# Patient Record
Sex: FEMALE | Race: BLACK OR AFRICAN AMERICAN | Employment: FULL TIME | URBAN - METROPOLITAN AREA
[De-identification: names, ages, dates, MRNs, and addresses within clinical notes are randomized per-mention and may not be internally consistent; named-entity substitution may affect disease eponyms.]

---

## 2018-06-07 ENCOUNTER — HOSPITAL ENCOUNTER (OUTPATIENT)
Dept: LAB | Age: 48
Discharge: HOME | End: 2018-06-07
Payer: COMMERCIAL

## 2018-06-07 DIAGNOSIS — I42.8 OTHER CARDIOMYOPATHY (HCC): ICD-10-CM

## 2018-06-07 DIAGNOSIS — R53.82 CHRONIC FATIGUE: ICD-10-CM

## 2018-06-07 LAB
ALBUMIN SERPL-MCNC: 4.1 G/DL (ref 3.5–5)
ALBUMIN/GLOB SERPL: 1.2 {RATIO}
ALP SERPL-CCNC: 80 U/L (ref 50–136)
ALT SERPL-CCNC: 27 U/L (ref 12–65)
ANION GAP SERPL CALC-SCNC: 7 MMOL/L
AST SERPL-CCNC: 18 U/L (ref 15–37)
BILIRUB SERPL-MCNC: 0.2 MG/DL (ref 0.2–1.1)
BUN SERPL-MCNC: 16 MG/DL (ref 6–23)
CALCIUM SERPL-MCNC: 9.2 MG/DL (ref 8.3–10.4)
CHLORIDE SERPL-SCNC: 103 MMOL/L (ref 98–107)
CO2 SERPL-SCNC: 29 MMOL/L (ref 21–32)
CREAT SERPL-MCNC: 1 MG/DL (ref 0.6–1)
GLOBULIN SER CALC-MCNC: 3.3 G/DL
GLUCOSE SERPL-MCNC: 81 MG/DL (ref 65–100)
POTASSIUM SERPL-SCNC: 3.3 MMOL/L (ref 3.5–5.1)
PROT SERPL-MCNC: 7.4 G/DL (ref 6.3–8.2)
SODIUM SERPL-SCNC: 139 MMOL/L (ref 136–145)
TSH SERPL DL<=0.005 MIU/L-ACNC: 1.81 UIU/ML (ref 0.36–3.74)

## 2018-06-07 PROCEDURE — 84443 ASSAY THYROID STIM HORMONE: CPT | Performed by: INTERNAL MEDICINE

## 2018-06-07 PROCEDURE — 36415 COLL VENOUS BLD VENIPUNCTURE: CPT | Performed by: INTERNAL MEDICINE

## 2018-06-07 PROCEDURE — 80053 COMPREHEN METABOLIC PANEL: CPT | Performed by: INTERNAL MEDICINE

## 2019-06-10 ENCOUNTER — APPOINTMENT (RX ONLY)
Dept: URBAN - METROPOLITAN AREA CLINIC 349 | Facility: CLINIC | Age: 49
Setting detail: DERMATOLOGY
End: 2019-06-10

## 2019-06-10 DIAGNOSIS — L21.8 OTHER SEBORRHEIC DERMATITIS: ICD-10-CM

## 2019-06-10 DIAGNOSIS — L65.9 NONSCARRING HAIR LOSS, UNSPECIFIED: ICD-10-CM

## 2019-06-10 PROCEDURE — 99202 OFFICE O/P NEW SF 15 MIN: CPT

## 2019-06-10 PROCEDURE — ? PRESCRIPTION

## 2019-06-10 PROCEDURE — ? COUNSELING

## 2019-06-10 PROCEDURE — ? RECOMMENDATIONS

## 2019-06-10 RX ORDER — CLOBETASOL PROPIONATE 0.46 MG/ML
SOLUTION TOPICAL
Qty: 1 | Refills: 1 | Status: ERX | COMMUNITY
Start: 2019-06-10

## 2019-06-10 RX ADMIN — CLOBETASOL PROPIONATE: 0.46 SOLUTION TOPICAL at 00:00

## 2019-06-10 ASSESSMENT — LOCATION DETAILED DESCRIPTION DERM
LOCATION DETAILED: LEFT LATERAL FOREHEAD
LOCATION DETAILED: RIGHT LATERAL FOREHEAD
LOCATION DETAILED: LEFT INFERIOR LATERAL FOREHEAD
LOCATION DETAILED: RIGHT INFERIOR LATERAL FOREHEAD
LOCATION DETAILED: LEFT MEDIAL FRONTAL SCALP

## 2019-06-10 ASSESSMENT — LOCATION SIMPLE DESCRIPTION DERM
LOCATION SIMPLE: LEFT FOREHEAD
LOCATION SIMPLE: LEFT SCALP
LOCATION SIMPLE: RIGHT FOREHEAD

## 2019-06-10 ASSESSMENT — LOCATION ZONE DERM
LOCATION ZONE: FACE
LOCATION ZONE: SCALP

## 2019-06-10 NOTE — HPI: HAIR LOSS
Previous Labs: No
How Did The Hair Loss Occur?: sudden in onset
How Severe Is Your Hair Loss?: moderate
Additional History: Pt is currently here for concerns of the scalp. Pt states that she experiences hair loss, burning and itching of the scalp.

## 2019-06-10 NOTE — PROCEDURE: RECOMMENDATIONS
Recommendations (Free Text): Clobetasol scalp solution nightly as needed \\nPt washes her hair every two weeks.
Recommendations (Free Text): Told pt can use her Clobetasol solution to help
Detail Level: Zone

## 2020-02-12 ENCOUNTER — HOSPITAL ENCOUNTER (OUTPATIENT)
Dept: LAB | Age: 50
Discharge: HOME | End: 2020-02-12
Payer: COMMERCIAL

## 2020-02-12 DIAGNOSIS — Z79.899 HIGH RISK MEDICATION USE: ICD-10-CM

## 2020-02-12 LAB
ANION GAP SERPL CALC-SCNC: 4 MMOL/L (ref 7–16)
BUN SERPL-MCNC: 19 MG/DL (ref 6–23)
CALCIUM SERPL-MCNC: 9.6 MG/DL (ref 8.3–10.4)
CHLORIDE SERPL-SCNC: 103 MMOL/L (ref 98–107)
CO2 SERPL-SCNC: 32 MMOL/L (ref 21–32)
CREAT SERPL-MCNC: 0.9 MG/DL (ref 0.6–1)
GLUCOSE SERPL-MCNC: 83 MG/DL (ref 65–100)
POTASSIUM SERPL-SCNC: 4.1 MMOL/L (ref 3.5–5.1)
SODIUM SERPL-SCNC: 139 MMOL/L (ref 136–145)

## 2020-02-12 PROCEDURE — 80048 BASIC METABOLIC PNL TOTAL CA: CPT

## 2020-02-12 PROCEDURE — 36415 COLL VENOUS BLD VENIPUNCTURE: CPT

## 2020-03-10 ENCOUNTER — HOSPITAL ENCOUNTER (OUTPATIENT)
Dept: LAB | Age: 50
Discharge: HOME | End: 2020-03-10
Payer: COMMERCIAL

## 2020-03-10 DIAGNOSIS — Z79.899 HIGH RISK MEDICATION USE: ICD-10-CM

## 2020-03-10 LAB
ANION GAP SERPL CALC-SCNC: 6 MMOL/L (ref 7–16)
BUN SERPL-MCNC: 15 MG/DL (ref 6–23)
CALCIUM SERPL-MCNC: 9.4 MG/DL (ref 8.3–10.4)
CHLORIDE SERPL-SCNC: 106 MMOL/L (ref 98–107)
CO2 SERPL-SCNC: 29 MMOL/L (ref 21–32)
CREAT SERPL-MCNC: 0.9 MG/DL (ref 0.6–1)
GLUCOSE SERPL-MCNC: 87 MG/DL (ref 65–100)
POTASSIUM SERPL-SCNC: 3.8 MMOL/L (ref 3.5–5.1)
SODIUM SERPL-SCNC: 141 MMOL/L (ref 136–145)

## 2020-03-10 PROCEDURE — 80048 BASIC METABOLIC PNL TOTAL CA: CPT

## 2020-03-10 PROCEDURE — 36415 COLL VENOUS BLD VENIPUNCTURE: CPT

## 2020-06-03 ENCOUNTER — HOSPITAL ENCOUNTER (OUTPATIENT)
Dept: LAB | Age: 50
Discharge: HOME | End: 2020-06-03
Payer: COMMERCIAL

## 2020-06-03 DIAGNOSIS — I42.0 DILATED CARDIOMYOPATHY (HCC): ICD-10-CM

## 2020-06-03 LAB
ANION GAP SERPL CALC-SCNC: 5 MMOL/L (ref 7–16)
BUN SERPL-MCNC: 14 MG/DL (ref 6–23)
CALCIUM SERPL-MCNC: 9.1 MG/DL (ref 8.3–10.4)
CHLORIDE SERPL-SCNC: 106 MMOL/L (ref 98–107)
CO2 SERPL-SCNC: 28 MMOL/L (ref 21–32)
CREAT SERPL-MCNC: 0.95 MG/DL (ref 0.6–1)
GLUCOSE SERPL-MCNC: 90 MG/DL (ref 65–100)
POTASSIUM SERPL-SCNC: 3.8 MMOL/L (ref 3.5–5.1)
SODIUM SERPL-SCNC: 139 MMOL/L (ref 136–145)

## 2020-06-03 PROCEDURE — 80048 BASIC METABOLIC PNL TOTAL CA: CPT

## 2020-06-03 PROCEDURE — 36415 COLL VENOUS BLD VENIPUNCTURE: CPT

## 2020-06-11 ENCOUNTER — HOSPITAL ENCOUNTER (OUTPATIENT)
Dept: LAB | Age: 50
Discharge: HOME | End: 2020-06-11
Attending: INTERNAL MEDICINE
Payer: COMMERCIAL

## 2020-06-11 LAB
ANION GAP SERPL CALC-SCNC: 3 MMOL/L (ref 7–16)
BUN SERPL-MCNC: 9 MG/DL (ref 6–23)
CALCIUM SERPL-MCNC: 8.6 MG/DL (ref 8.3–10.4)
CHLORIDE SERPL-SCNC: 111 MMOL/L (ref 98–107)
CO2 SERPL-SCNC: 27 MMOL/L (ref 21–32)
CREAT SERPL-MCNC: 0.93 MG/DL (ref 0.6–1)
GLUCOSE SERPL-MCNC: 84 MG/DL (ref 65–100)
POTASSIUM SERPL-SCNC: 4.1 MMOL/L (ref 3.5–5.1)
SODIUM SERPL-SCNC: 141 MMOL/L (ref 136–145)

## 2020-06-11 PROCEDURE — 80048 BASIC METABOLIC PNL TOTAL CA: CPT

## 2020-06-11 PROCEDURE — 36415 COLL VENOUS BLD VENIPUNCTURE: CPT

## 2020-07-24 ENCOUNTER — APPOINTMENT (RX ONLY)
Dept: URBAN - METROPOLITAN AREA CLINIC 349 | Facility: CLINIC | Age: 50
Setting detail: DERMATOLOGY
End: 2020-07-24

## 2020-07-24 DIAGNOSIS — L65.9 NONSCARRING HAIR LOSS, UNSPECIFIED: ICD-10-CM

## 2020-07-24 DIAGNOSIS — L21.8 OTHER SEBORRHEIC DERMATITIS: ICD-10-CM | Status: WORSENING

## 2020-07-24 PROBLEM — K21.9 GASTRO-ESOPHAGEAL REFLUX DISEASE WITHOUT ESOPHAGITIS: Status: ACTIVE | Noted: 2020-07-24

## 2020-07-24 PROBLEM — I10 ESSENTIAL (PRIMARY) HYPERTENSION: Status: ACTIVE | Noted: 2020-07-24

## 2020-07-24 PROCEDURE — 99213 OFFICE O/P EST LOW 20 MIN: CPT

## 2020-07-24 PROCEDURE — ? PRESCRIPTION

## 2020-07-24 PROCEDURE — ? COUNSELING

## 2020-07-24 PROCEDURE — ? RECOMMENDATIONS

## 2020-07-24 RX ORDER — CLOBETASOL PROPIONATE 0.46 MG/ML
SOLUTION TOPICAL
Qty: 1 | Refills: 1 | Status: ERX

## 2020-07-24 RX ORDER — KETOCONAZOLE 20 MG/ML
SHAMPOO, SUSPENSION TOPICAL
Qty: 1 | Refills: 3 | Status: ERX | COMMUNITY
Start: 2020-07-24

## 2020-07-24 RX ADMIN — KETOCONAZOLE: 20 SHAMPOO, SUSPENSION TOPICAL at 00:00

## 2020-07-24 ASSESSMENT — LOCATION ZONE DERM
LOCATION ZONE: SCALP
LOCATION ZONE: FACE

## 2020-07-24 ASSESSMENT — LOCATION DETAILED DESCRIPTION DERM
LOCATION DETAILED: LEFT MEDIAL FRONTAL SCALP
LOCATION DETAILED: RIGHT LATERAL FOREHEAD
LOCATION DETAILED: LEFT INFERIOR LATERAL FOREHEAD
LOCATION DETAILED: LEFT LATERAL FOREHEAD

## 2020-07-24 ASSESSMENT — LOCATION SIMPLE DESCRIPTION DERM
LOCATION SIMPLE: RIGHT FOREHEAD
LOCATION SIMPLE: LEFT FOREHEAD
LOCATION SIMPLE: LEFT SCALP

## 2020-07-24 NOTE — PROCEDURE: RECOMMENDATIONS
Recommendations (Free Text): Clobetasol scalp solution nightly as needed \\nPt washes her hair once a week.
Detail Level: Zone
Recommendations (Free Text): Told pt can use her Clobetasol solution to help

## 2021-02-01 ENCOUNTER — RX ONLY (OUTPATIENT)
Age: 51
Setting detail: RX ONLY
End: 2021-02-01

## 2021-02-01 RX ORDER — CLOBETASOL PROPIONATE 0.5 MG/ML
SOLUTION TOPICAL
Qty: 1 | Refills: 1 | Status: ERX

## 2021-03-12 ENCOUNTER — APPOINTMENT (RX ONLY)
Dept: URBAN - METROPOLITAN AREA CLINIC 349 | Facility: CLINIC | Age: 51
Setting detail: DERMATOLOGY
End: 2021-03-12

## 2021-03-12 DIAGNOSIS — L72.8 OTHER FOLLICULAR CYSTS OF THE SKIN AND SUBCUTANEOUS TISSUE: ICD-10-CM

## 2021-03-12 PROCEDURE — ? OTHER

## 2021-03-12 PROCEDURE — ? RECOMMENDATIONS

## 2021-03-12 PROCEDURE — ? COUNSELING

## 2021-03-12 ASSESSMENT — LOCATION ZONE DERM: LOCATION ZONE: TRUNK

## 2021-03-12 ASSESSMENT — LOCATION SIMPLE DESCRIPTION DERM: LOCATION SIMPLE: CHEST

## 2021-03-12 ASSESSMENT — LOCATION DETAILED DESCRIPTION DERM: LOCATION DETAILED: LEFT MEDIAL SUPERIOR CHEST

## 2021-03-12 NOTE — PROCEDURE: RECOMMENDATIONS
Render Risk Assessment In Note?: no
Recommendations (Free Text): Discussed seeing plastic surgeon to remove since on chest
Detail Level: Simple

## 2021-03-12 NOTE — PROCEDURE: OTHER
Note Text (......Xxx Chief Complaint.): This diagnosis correlates with the
Render Risk Assessment In Note?: yes
Other (Free Text): No charge for visit today
Detail Level: Zone

## 2021-10-25 ENCOUNTER — HOSPITAL ENCOUNTER (OUTPATIENT)
Dept: LAB | Age: 51
Discharge: HOME | End: 2021-10-25
Payer: COMMERCIAL

## 2021-10-25 DIAGNOSIS — I42.0 DILATED CARDIOMYOPATHY (HCC): ICD-10-CM

## 2021-10-25 DIAGNOSIS — R00.2 PALPITATIONS: ICD-10-CM

## 2021-10-25 LAB
ANION GAP SERPL CALC-SCNC: 5 MMOL/L (ref 7–16)
BASOPHILS # BLD: 0 K/UL (ref 0–0.2)
BASOPHILS NFR BLD: 0 % (ref 0–2)
BUN SERPL-MCNC: 12 MG/DL (ref 6–23)
CALCIUM SERPL-MCNC: 9.4 MG/DL (ref 8.3–10.4)
CHLORIDE SERPL-SCNC: 108 MMOL/L (ref 98–107)
CO2 SERPL-SCNC: 27 MMOL/L (ref 21–32)
CREAT SERPL-MCNC: 0.91 MG/DL (ref 0.6–1)
DIFFERENTIAL METHOD BLD: ABNORMAL
EOSINOPHIL # BLD: 0.1 K/UL (ref 0–0.8)
EOSINOPHIL NFR BLD: 2 % (ref 0.5–7.8)
ERYTHROCYTE [DISTWIDTH] IN BLOOD BY AUTOMATED COUNT: 12.8 % (ref 11.9–14.6)
GLUCOSE SERPL-MCNC: 82 MG/DL (ref 65–100)
HCT VFR BLD AUTO: 37 % (ref 35.8–46.3)
HGB BLD-MCNC: 11.3 G/DL (ref 11.7–15.4)
IMM GRANULOCYTES # BLD AUTO: 0 K/UL (ref 0–0.5)
IMM GRANULOCYTES NFR BLD AUTO: 0 % (ref 0–5)
INR PPP: 0.9
LYMPHOCYTES # BLD: 2 K/UL (ref 0.5–4.6)
LYMPHOCYTES NFR BLD: 45 % (ref 13–44)
MCH RBC QN AUTO: 28.4 PG (ref 26.1–32.9)
MCHC RBC AUTO-ENTMCNC: 30.5 G/DL (ref 31.4–35)
MCV RBC AUTO: 93 FL (ref 79.6–97.8)
MONOCYTES # BLD: 0.4 K/UL (ref 0.1–1.3)
MONOCYTES NFR BLD: 9 % (ref 4–12)
NEUTS SEG # BLD: 1.9 K/UL (ref 1.7–8.2)
NEUTS SEG NFR BLD: 43 % (ref 43–78)
NRBC # BLD: 0 K/UL (ref 0–0.2)
PLATELET # BLD AUTO: 277 K/UL (ref 150–450)
PMV BLD AUTO: 12.7 FL (ref 9.4–12.3)
POTASSIUM SERPL-SCNC: 3.6 MMOL/L (ref 3.5–5.1)
PROTHROMBIN TIME: 12.6 SEC (ref 12.6–14.5)
RBC # BLD AUTO: 3.98 M/UL (ref 4.05–5.2)
SODIUM SERPL-SCNC: 140 MMOL/L (ref 136–145)
WBC # BLD AUTO: 4.5 K/UL (ref 4.3–11.1)

## 2021-10-25 PROCEDURE — 36415 COLL VENOUS BLD VENIPUNCTURE: CPT

## 2021-10-25 PROCEDURE — 85025 COMPLETE CBC W/AUTO DIFF WBC: CPT

## 2021-10-25 PROCEDURE — 85610 PROTHROMBIN TIME: CPT

## 2021-10-25 PROCEDURE — 80048 BASIC METABOLIC PNL TOTAL CA: CPT

## 2021-10-26 NOTE — PROGRESS NOTES
Patient pre-assessment complete for Mamie Lechuga scheduled for ICD, arrival time 0600. Patient verified using . Patient instructed to bring all home medications in labeled bottles on the day of procedure. NPO status reinforced. . Patient instructed to HOLD aldactone and over the counter medications. Instructed they can take all other medications excluding vitamins & supplements. Patient verbalizes understanding of all instructions & denies any questions at this time.

## 2021-10-27 ENCOUNTER — APPOINTMENT (OUTPATIENT)
Dept: GENERAL RADIOLOGY | Age: 51
End: 2021-10-27
Attending: INTERNAL MEDICINE
Payer: COMMERCIAL

## 2021-10-27 ENCOUNTER — HOSPITAL ENCOUNTER (OUTPATIENT)
Age: 51
Setting detail: OUTPATIENT SURGERY
Discharge: HOME | End: 2021-10-27
Attending: INTERNAL MEDICINE | Admitting: INTERNAL MEDICINE
Payer: COMMERCIAL

## 2021-10-27 ENCOUNTER — ANESTHESIA (OUTPATIENT)
Dept: CARDIAC CATH/INVASIVE PROCEDURES | Age: 51
End: 2021-10-27
Payer: COMMERCIAL

## 2021-10-27 ENCOUNTER — ANESTHESIA EVENT (OUTPATIENT)
Dept: CARDIAC CATH/INVASIVE PROCEDURES | Age: 51
End: 2021-10-27
Payer: COMMERCIAL

## 2021-10-27 VITALS
TEMPERATURE: 98.4 F | WEIGHT: 149 LBS | RESPIRATION RATE: 16 BRPM | OXYGEN SATURATION: 99 % | HEART RATE: 67 BPM | SYSTOLIC BLOOD PRESSURE: 105 MMHG | HEIGHT: 65 IN | DIASTOLIC BLOOD PRESSURE: 80 MMHG | BODY MASS INDEX: 24.83 KG/M2

## 2021-10-27 DIAGNOSIS — I42.0 DILATED CARDIOMYOPATHY (HCC): ICD-10-CM

## 2021-10-27 DIAGNOSIS — I42.8 NICM (NONISCHEMIC CARDIOMYOPATHY) (HCC): ICD-10-CM

## 2021-10-27 DIAGNOSIS — I42.9 CARDIOMYOPATHY (HCC): Primary | ICD-10-CM

## 2021-10-27 LAB
ANION GAP SERPL CALC-SCNC: 4 MMOL/L (ref 7–16)
ATRIAL RATE: 54 BPM
BUN SERPL-MCNC: 14 MG/DL (ref 6–23)
CALCIUM SERPL-MCNC: 9.6 MG/DL (ref 8.3–10.4)
CALCULATED P AXIS, ECG09: 23 DEGREES
CALCULATED R AXIS, ECG10: 0 DEGREES
CALCULATED T AXIS, ECG11: 30 DEGREES
CHLORIDE SERPL-SCNC: 104 MMOL/L (ref 98–107)
CO2 SERPL-SCNC: 31 MMOL/L (ref 21–32)
CREAT SERPL-MCNC: 0.96 MG/DL (ref 0.6–1)
DIAGNOSIS, 93000: NORMAL
ERYTHROCYTE [DISTWIDTH] IN BLOOD BY AUTOMATED COUNT: 12.6 % (ref 11.9–14.6)
GLUCOSE SERPL-MCNC: 85 MG/DL (ref 65–100)
HCT VFR BLD AUTO: 38.1 % (ref 35.8–46.3)
HGB BLD-MCNC: 11.8 G/DL (ref 11.7–15.4)
MAGNESIUM SERPL-MCNC: 1.9 MG/DL (ref 1.8–2.4)
MCH RBC QN AUTO: 28.5 PG (ref 26.1–32.9)
MCHC RBC AUTO-ENTMCNC: 31 G/DL (ref 31.4–35)
MCV RBC AUTO: 92 FL (ref 79.6–97.8)
NRBC # BLD: 0 K/UL (ref 0–0.2)
P-R INTERVAL, ECG05: 200 MS
PLATELET # BLD AUTO: 285 K/UL (ref 150–450)
PMV BLD AUTO: 10.1 FL (ref 9.4–12.3)
POTASSIUM SERPL-SCNC: 3.2 MMOL/L (ref 3.5–5.1)
Q-T INTERVAL, ECG07: 442 MS
QRS DURATION, ECG06: 84 MS
QTC CALCULATION (BEZET), ECG08: 419 MS
RBC # BLD AUTO: 4.14 M/UL (ref 4.05–5.2)
SODIUM SERPL-SCNC: 139 MMOL/L (ref 136–145)
VENTRICULAR RATE, ECG03: 54 BPM
WBC # BLD AUTO: 5.3 K/UL (ref 4.3–11.1)

## 2021-10-27 PROCEDURE — 74011000272 HC RX REV CODE- 272: Performed by: INTERNAL MEDICINE

## 2021-10-27 PROCEDURE — 76060000034 HC ANESTHESIA 1.5 TO 2 HR: Performed by: INTERNAL MEDICINE

## 2021-10-27 PROCEDURE — 93005 ELECTROCARDIOGRAM TRACING: CPT | Performed by: INTERNAL MEDICINE

## 2021-10-27 PROCEDURE — 74011000250 HC RX REV CODE- 250: Performed by: INTERNAL MEDICINE

## 2021-10-27 PROCEDURE — C1894 INTRO/SHEATH, NON-LASER: HCPCS | Performed by: INTERNAL MEDICINE

## 2021-10-27 PROCEDURE — 77030010507 HC ADH SKN DERMBND J&J -B: Performed by: INTERNAL MEDICINE

## 2021-10-27 PROCEDURE — 77030013687 HC GD NDL BARD -B: Performed by: INTERNAL MEDICINE

## 2021-10-27 PROCEDURE — 80048 BASIC METABOLIC PNL TOTAL CA: CPT

## 2021-10-27 PROCEDURE — 74011250636 HC RX REV CODE- 250/636: Performed by: INTERNAL MEDICINE

## 2021-10-27 PROCEDURE — 77030022704 HC SUT VLOC COVD -B: Performed by: INTERNAL MEDICINE

## 2021-10-27 PROCEDURE — C1777 LEAD, AICD, ENDO SINGLE COIL: HCPCS | Performed by: INTERNAL MEDICINE

## 2021-10-27 PROCEDURE — 85027 COMPLETE CBC AUTOMATED: CPT

## 2021-10-27 PROCEDURE — 77030013504 HC DEV ELECSURG MEDT -F: Performed by: INTERNAL MEDICINE

## 2021-10-27 PROCEDURE — 77030018547 HC SUT ETHBND1 J&J -B: Performed by: INTERNAL MEDICINE

## 2021-10-27 PROCEDURE — 74011250636 HC RX REV CODE- 250/636: Performed by: ANESTHESIOLOGY

## 2021-10-27 PROCEDURE — C1892 INTRO/SHEATH,FIXED,PEEL-AWAY: HCPCS | Performed by: INTERNAL MEDICINE

## 2021-10-27 PROCEDURE — 77030041279 HC DRSG PRMSL AG MDII -B: Performed by: INTERNAL MEDICINE

## 2021-10-27 PROCEDURE — 74011250636 HC RX REV CODE- 250/636: Performed by: NURSE ANESTHETIST, CERTIFIED REGISTERED

## 2021-10-27 PROCEDURE — 83735 ASSAY OF MAGNESIUM: CPT

## 2021-10-27 PROCEDURE — C1898 LEAD, PMKR, OTHER THAN TRANS: HCPCS | Performed by: INTERNAL MEDICINE

## 2021-10-27 PROCEDURE — 71045 X-RAY EXAM CHEST 1 VIEW: CPT

## 2021-10-27 PROCEDURE — 33249 INSJ/RPLCMT DEFIB W/LEAD(S): CPT | Performed by: INTERNAL MEDICINE

## 2021-10-27 PROCEDURE — C1721 AICD, DUAL CHAMBER: HCPCS | Performed by: INTERNAL MEDICINE

## 2021-10-27 PROCEDURE — 74011000636 HC RX REV CODE- 636: Performed by: INTERNAL MEDICINE

## 2021-10-27 PROCEDURE — 77030033067 HC SUT PDO STRATFX SPIR J&J -B: Performed by: INTERNAL MEDICINE

## 2021-10-27 PROCEDURE — 74011000250 HC RX REV CODE- 250: Performed by: NURSE ANESTHETIST, CERTIFIED REGISTERED

## 2021-10-27 DEVICE — INTEGRATED BIPOLAR PACE/SENSE AND DEFIBRILLATION LEAD
Type: IMPLANTABLE DEVICE | Site: CHEST | Status: FUNCTIONAL
Brand: RELIANCE 4-FRONT™

## 2021-10-27 DEVICE — PACE/SENSE LEAD
Type: IMPLANTABLE DEVICE | Site: CHEST | Status: FUNCTIONAL
Brand: INGEVITY™+

## 2021-10-27 DEVICE — IMPLANTABLE CARDIOVERTER DEFIBRILLATOR DR
Type: IMPLANTABLE DEVICE | Site: CHEST | Status: FUNCTIONAL
Brand: VIGILANT™ EL ICD DR

## 2021-10-27 RX ORDER — HYDROMORPHONE HYDROCHLORIDE 2 MG/ML
0.5 INJECTION, SOLUTION INTRAMUSCULAR; INTRAVENOUS; SUBCUTANEOUS
Status: DISCONTINUED | OUTPATIENT
Start: 2021-10-27 | End: 2021-10-27 | Stop reason: HOSPADM

## 2021-10-27 RX ORDER — PROPOFOL 10 MG/ML
INJECTION, EMULSION INTRAVENOUS AS NEEDED
Status: DISCONTINUED | OUTPATIENT
Start: 2021-10-27 | End: 2021-10-27 | Stop reason: HOSPADM

## 2021-10-27 RX ORDER — CLINDAMYCIN PHOSPHATE 900 MG/50ML
900 INJECTION INTRAVENOUS ONCE
Status: COMPLETED | OUTPATIENT
Start: 2021-10-27 | End: 2021-10-27

## 2021-10-27 RX ORDER — LIDOCAINE HYDROCHLORIDE 20 MG/ML
INJECTION, SOLUTION EPIDURAL; INFILTRATION; INTRACAUDAL; PERINEURAL AS NEEDED
Status: DISCONTINUED | OUTPATIENT
Start: 2021-10-27 | End: 2021-10-27 | Stop reason: HOSPADM

## 2021-10-27 RX ORDER — PROPOFOL 10 MG/ML
INJECTION, EMULSION INTRAVENOUS
Status: DISCONTINUED | OUTPATIENT
Start: 2021-10-27 | End: 2021-10-27 | Stop reason: HOSPADM

## 2021-10-27 RX ORDER — SODIUM CHLORIDE, SODIUM LACTATE, POTASSIUM CHLORIDE, CALCIUM CHLORIDE 600; 310; 30; 20 MG/100ML; MG/100ML; MG/100ML; MG/100ML
100 INJECTION, SOLUTION INTRAVENOUS CONTINUOUS
Status: DISCONTINUED | OUTPATIENT
Start: 2021-10-27 | End: 2021-10-27 | Stop reason: HOSPADM

## 2021-10-27 RX ORDER — SODIUM CHLORIDE, SODIUM LACTATE, POTASSIUM CHLORIDE, CALCIUM CHLORIDE 600; 310; 30; 20 MG/100ML; MG/100ML; MG/100ML; MG/100ML
75 INJECTION, SOLUTION INTRAVENOUS CONTINUOUS
Status: DISCONTINUED | OUTPATIENT
Start: 2021-10-27 | End: 2021-10-27 | Stop reason: HOSPADM

## 2021-10-27 RX ORDER — OXYCODONE HYDROCHLORIDE 5 MG/1
5 TABLET ORAL
Qty: 5 TABLET | Refills: 0 | Status: SHIPPED | OUTPATIENT
Start: 2021-10-27 | End: 2021-10-30

## 2021-10-27 RX ORDER — NALOXONE HYDROCHLORIDE 0.4 MG/ML
0.04 INJECTION, SOLUTION INTRAMUSCULAR; INTRAVENOUS; SUBCUTANEOUS
Status: DISCONTINUED | OUTPATIENT
Start: 2021-10-27 | End: 2021-10-27 | Stop reason: HOSPADM

## 2021-10-27 RX ORDER — POLYETHYLENE GLYCOL 400 AND PROPYLENE GLYCOL 4; 3 MG/ML; MG/ML
1 SOLUTION/ DROPS OPHTHALMIC AS NEEDED
COMMUNITY

## 2021-10-27 RX ADMIN — HYDROMORPHONE HYDROCHLORIDE 0.5 MG: 2 INJECTION, SOLUTION INTRAMUSCULAR; INTRAVENOUS; SUBCUTANEOUS at 09:58

## 2021-10-27 RX ADMIN — PROPOFOL 30 MG: 10 INJECTION, EMULSION INTRAVENOUS at 08:58

## 2021-10-27 RX ADMIN — PROPOFOL 50 MG: 10 INJECTION, EMULSION INTRAVENOUS at 08:01

## 2021-10-27 RX ADMIN — HYDROMORPHONE HYDROCHLORIDE 0.5 MG: 2 INJECTION, SOLUTION INTRAMUSCULAR; INTRAVENOUS; SUBCUTANEOUS at 09:42

## 2021-10-27 RX ADMIN — HYDROMORPHONE HYDROCHLORIDE 0.5 MG: 2 INJECTION, SOLUTION INTRAMUSCULAR; INTRAVENOUS; SUBCUTANEOUS at 09:51

## 2021-10-27 RX ADMIN — CLINDAMYCIN PHOSPHATE 900 MG: 900 INJECTION, SOLUTION INTRAVENOUS at 08:05

## 2021-10-27 RX ADMIN — SODIUM CHLORIDE, SODIUM LACTATE, POTASSIUM CHLORIDE, AND CALCIUM CHLORIDE: 600; 310; 30; 20 INJECTION, SOLUTION INTRAVENOUS at 07:52

## 2021-10-27 RX ADMIN — PROPOFOL 140 MCG/KG/MIN: 10 INJECTION, EMULSION INTRAVENOUS at 08:01

## 2021-10-27 RX ADMIN — LIDOCAINE HYDROCHLORIDE 40 MG: 20 INJECTION, SOLUTION EPIDURAL; INFILTRATION; INTRACAUDAL; PERINEURAL at 08:01

## 2021-10-27 NOTE — PROCEDURES
: Reno Orozco. Bharati Brown MD    REFERRING: Kasia Ratliff MD    Pre-Procedure Diagnosis  1. Chronic systolic heart failure, ejection fraction of 30-35%  2. Nonischemic dilated cardiomyopathy. 3. Class II heart failure symptoms. 4. Chronic systolic heart failure for a minimum of 3 months duration on optimal medical therapy. 5. Primary prevention indications for defibrillator  6. Life expectancy greater than 1 year. Procedure Performed  1. Insertion of an implantable cardioverter defibrillator. Complications: None    Contrast: 15 cc    Fluoroscopy Time: 6.5 minutes    Anesthesia: Con-Sed     Estimated Blood Loss: Less than 10 mL     Specimens: * No specimens in log *     Patient Information and Indications: The procedure, indications, risks, benefits, and alternatives were discussed with the patient and family members, who desired to proceed after questions were answered and informed consent was documented. Procedure: After informed consent was obtained, the patient was brought to the Electrophysiology Laboratory in a fasting state and was prepped and draped in sterile fashion. Prophylactic antibiotic was administered 10 minutes prior to skin incision: refer to anesthesia procedural documentation for full details. Conscious sedation was administered by anesthesia with continuous oxygen saturation measurement and blood pressure measurement. A left upper extremity venogram demonstrated a patient left axillary and subclavian vein without obvious obstruction. Local anesthetic (sensorcaine) was delivered to the left pectoral region. An incision was made parallel to the deltopectoral groove. A subcutaneous pocket was created using blunt dissection and electrocautery, and adequate hemostasis was established. Access x 2 was obtained under fluoroscopic/ultrasound guidance using a modified Seldinger technique with placement of 2 wires down into the RA and advanced below the diaphragm.  Next, placement of an 8 Fr peel-away sheath over the first guidewire was performed. A permanent RV single coil ICD lead was then advanced under fluoroscopic guidance via the 8 Fr sheath into the RV in a stable position with satisfactory sensing and pacing characteristics. The peel away sheath was removed. A 6 Fr Safe-sheath was then placed in the second guidewire. A permanent pacing lead was advanced under fluoroscopic guidance and positioned in the right atrium. Stable RA appendage position with satisfactory sensing and pacing characteristics was obtained. The sheath was peeled. The leads were sutured to the underlying pectoralis fascia using 2-0 Ethibond. The lead slack and position was assessed under fluoroscopy while securing the lead collars to ensure proper length. Final lead testing via the pacing system analyzer (PSA) demonstrated stable sensing, impedance and pacing thresholds. The lead pins were then cleaned with antibiotic soaked gauze, dried gently, and attached to a new ICD generator. Pins were directly observed to pass the tip electrode, and the ring hex wrench screws were secured, and leads tug tested. The device and leads were gently positioned within the pocket and a retention suture was placed after the pocket was irrigated copiously with a saline antimicrobial solution. The wound was closed with 2 layers of 3-0 monocryl (Stratafix) suture followed by skin closure with 4-0 V-Loc. Exofin solution was placed over the wound, allowed to dry, and then a sterile Aquacel dressing was placed over the wound. Fluoroscopic images were interpreted by me, in multiple projections. DFT testing was not performed. The patient tolerated the procedure and there were no complications. The patient was allowed to awake from anesthesia and transferred to the recovery area in stable condition.     Device and Lead Information  Pulse Generator Model #  Serial # Location Implant/Explant   [id number] [facility] [id number] Left Pectoral Implant 10.27.2021     Lead Model Number  Serial Number Lead position Implant/Explant   RA [id number] [facility] [id number] RA [place] Implant 10.27.2021   RV [id number] [facility] [id number] RV [place] Implant 10.27.2021     Lead Sensitivity and Threshold  Lead R or P sensitivity (mv) Threshold (V) Threshold PW (msec) Impedance (ohms) Final output Voltage (V) Final PW (msec)   RA 3.3 0.7 0.4 743 3.5 0.4   RV 6.0 0.7 0.4 642 3.5 0.4     Bradycardia Settings  Ellis Mode LRL URL Pace AVD (ms) Sense AVD (ms) Rate Response Mode Switching Mode SW Rate   DDDR 60 130 300 300 On On 150     Tachycardia Settings  Zone Type VT1 VT2 VF   ON/OFF/  MONITOR MONITOR ON ON   Zone Rate 150 185 220   1st Therapy Type  ATP ATP   Energy (J)  X4 X1   2nd Therapy Type  Shock Shock   Energy (J)  31J 41J   3rd Therapy Type  Shock Shock   Energy (J)  41J 41J   4th Therapy Type  Shock Shock   Energy (J)  41J 41J   5th Therapy Type  Shock Shock   Energy (J)  41J 41J   6th Therapy Type  Shock Shock   Energy (J)  41J x2 41J x4     Defibrillation Threshold Testing  DFT# How induced Successful test? Shock Imped (ohms) Energy (J) Charge time (sec) Rescue needed? Defib threshold (J)   1 Did not test  62           IMPRESSION: Successful Dual Chamber ICD implant. MRI conditional.     PLAN: Same-day discharge.   -Routine CIED instructions.  -Device clinic follow up in 1-2 weeks. Milburn Phalen.  Laura Wu MD, [address]  Clinical Cardiac Electrophysiology  [facility] Cardiology    10/27/2021  10:13 AM

## 2021-10-27 NOTE — ANESTHESIA POSTPROCEDURE EVALUATION
Procedure(s):  INSERT ICD DUAL. total IV anesthesia    Anesthesia Post Evaluation        Patient location during evaluation: PACU  Patient participation: complete - patient participated  Level of consciousness: awake  Pain management: satisfactory to patient  Airway patency: patent  Anesthetic complications: no  Cardiovascular status: hemodynamically stable  Respiratory status: spontaneous ventilation  Hydration status: euvolemic  Post anesthesia nausea and vomiting:  none      INITIAL Post-op Vital signs:   Vitals Value Taken Time   /92 10/27/21 1110   Temp 36.9 °C (98.4 °F) 10/27/21 0933   Pulse 74 10/27/21 1126   Resp 12 10/27/21 0933   SpO2 100 % 10/27/21 1126   Vitals shown include unvalidated device data.

## 2021-10-27 NOTE — PROGRESS NOTES
Report received post ICD placement. Dsg to left chest wall D&I. C/o pain 9/10 on arrival. Sling intact.

## 2021-10-27 NOTE — PROGRESS NOTES
Patient received to [address] [place] [address] room # 9  Ambulatory from [facility]. Patient scheduled for ICD Implant today with Dr Jovon Cabral. Procedure reviewed & questions answered, voiced good understanding consent obtained & placed on chart. All medications and medical history reviewed. Will prep patient per orders. Patient & family updated on plan of care. The patient has a fraility score of 3-MANAGING WELL, based on ability to ambulate independently.

## 2021-10-27 NOTE — Clinical Note
Pacemaker generator placed in pocket. Closed in layers; 3-0 stratafix and 4-0 vloc. Dermabond used to approximate incision and aquacel dressing placed over incision site.

## 2021-10-27 NOTE — Clinical Note
Sheath #1: Sheath: inserted. Sheath inserted/placed in the left axillary  vein. Upon evaluation of the common femoral artery stick using fluoroscopy, the access site puncture was within the safe zone.  8F safe sheath

## 2021-10-27 NOTE — DISCHARGE INSTRUCTIONS
Post Op Device Instructions        Incision & Dressing Care   Please keep Aquacel dressing on wound until your 2 week follow up in device clinic. The dressing promotes healing and is meant to stay on the wound. Keep incision site completely dry for 48 hours. During this time you may take a sponge bath, but no shower. After 48 hours you may shower with your back to the water letting the water run over the dressing. Do not let the water directly hit the dressing, it is water resistant no water proof. Do not use any lotions, creams, or ointments on the incision. Avoid manipulating the device or leads with your fingers. Do not massage or excessively rub the implant site. This could displace the leads      For four weeks after your implant   Do not lift anything heavier than a gallon of milk. Do not raise your elbow above the level of your shoulder on the Left shoulder implant side. Avoid excessive pushing, pulling, or twisting. Call you doctor for any of the following:   Any signs of infection, including redness, swelling or pain at the incision site, or a   temperature of 101° F or greater. If you have twitching chest muscles, hiccups that won't stop, or a swollen arm on the   side of the incision. Any feelings of light-headedness, chest pain, or shortness of breath. Please notify your doctors and dentists that you have an ICD. You should not drive until 1 week after your implant or after your first follow-up appointment, whichever comes first. At that time, you can discuss when you may return to your regular driving routine. About 1 month after implant, you will receive a card by mail from the company who manufactured your ICD. Your device was manufactured by [employer]. You should carry this card with you at all times. If you receive one shock from your device:   and you feel ok, you may call to let your physician know.    but if you are feeling poorly, please notify your doctor. You may need to be seen. If you receive more than one shock in a 24 hour period, call your physician to schedule a visit or report to the emergency room. Please call the [facility] [address] at  [phone] if you have questions or concerns about your device. Please call the hospital if it is after 5 pm or the weekend please page the on call cardiologist at [facility] at [address] will need to have your device checked 1- 2 weeks after the procedure and should have an appointment with the device clinic. If you do not hear from them call the device clinic. Sedation for a Medical Procedure: Care Instructions     You were given a sedative medication during your visit. While many of the effects will have worn   off before you leave; you may continue to feel some effects for several hours. Common side effects from sedation include:  · Feeling sleepy. (Your doctors and nurses will make sure you are not too sleepy to go home.)  · Nausea and vomiting. This usually does not last long. · Feeling tired. How can you care for yourself at home? Activity    · Don't do anything for 24 hours that requires attention to detail. It takes time for the medicine effects to completely wear off. · Do not make important legal decisions for 24 hours. · Do not sign any legal documents for 24 hours. · Do not drink alcohol today     · For your safety, you should not drive or operate heavy machinery for the remainder of the day     · Rest when you feel tired. Getting enough sleep will help you recover. Diet    · You can eat your normal diet, unless your doctor gives you other instructions. If your stomach is upset, try clear liquids and bland, low-fat foods like plain toast or rice. · Drink plenty of fluids (unless your doctor tells you not to). · Don't drink alcohol for 24 hours. Medicines    · Be safe with medicines.  Read and follow all instructions on the label. · If the doctor gave you a prescription medicine for pain, take it as prescribed. · If you are not taking a prescription pain medicine, ask your doctor if you can take an over-the-counter medicine. · If you think your pain medicine is making you sick to your stomach:  · Take your medicine after meals (unless your doctor has told you not to). · Ask your doctor for a different pain medicine. I have read the above instructions and have had the opportunity to ask questions. Patient: ________________________   Date: _____________    Witness: _______________________   Date: _____________          Post Op Device Instructions        Please keep Aquacel dressing on wound until your 1-2 week follow up in device clinic. The dressing promotes healing and is meant to stay on the wound. Keep incision site completely dry for one week. During this week you may take a sponge bath, but no shower. After one week you may shower, cleaning the wound with soap and fuentes. Do not use any lotions, creams, or ointments on the incision.         For four weeks after your implant    Do not lift anything heavier than a gallon of milk.    Do not raise your elbow above the level of your shoulder on the ICD implant side.    Avoid excessive pushing, pulling, or twisting.    Call you doctor for any of the following:    Any signs of infection, including redness, swelling or pain at the incision site, or a temperature of 101° F or greater.    If you have twitching chest muscles, hiccups that won't stop, or a swollen arm on the side of the incision.

## 2021-10-27 NOTE — PROGRESS NOTES
Discharge instructions given. Dsg to left chest wall D&I. Remains with sling intact. States pain 3/10 at present after pain medication given.  at bedside.

## 2021-10-27 NOTE — Clinical Note
Sheath #2: Sheath: inserted. Sheath inserted/placed in the left axillary  vein. Upon evaluation of the common femoral artery stick using fluoroscopy, the access site puncture was within the safe zone.  6F SafeSheath

## 2021-10-27 NOTE — Clinical Note
A venogram was performed on the left subclavian. Injected with single hand injection. Injection volume  = 151 mL.

## 2021-10-27 NOTE — ANESTHESIA PREPROCEDURE EVALUATION
Relevant Problems   No relevant active problems       Anesthetic History   No history of anesthetic complications            Review of Systems / Medical History  Pertinent labs reviewed    Pulmonary  Within defined limits                 Neuro/Psych   Within defined limits           Cardiovascular    Hypertension      CHF (NICM)  Dysrhythmias (bradycardia, palps)       Exercise tolerance: <4 METS: At about 4 METs. Limited by SOB. Comments: Echo 8/21:  · LA: Dilated left atrium. Left Atrium volume index is 33.7 mL/m2. · LV: Calculated LVEF is 35%. Dilated left ventricle. Upper normal wall thickness. Severely reduced systolic function. · MV: Mild mitral valve regurgitation is present.    GI/Hepatic/Renal     GERD: well controlled           Endo/Other        Arthritis     Other Findings            Physical Exam    Airway  Mallampati: II  TM Distance: 4 - 6 cm  Neck ROM: normal range of motion   Mouth opening: Normal     Cardiovascular  Regular rate and rhythm,  S1 and S2 normal,  no murmur, click, rub, or gallop             Dental  No notable dental hx       Pulmonary  Breath sounds clear to auscultation               Abdominal  GI exam deferred       Other Findings            Anesthetic Plan    ASA: 4  Anesthesia type: total IV anesthesia          Induction: Intravenous  Anesthetic plan and risks discussed with: Patient and Spouse

## 2021-12-24 ENCOUNTER — HOSPITAL ENCOUNTER (EMERGENCY)
Age: 51
Discharge: HOME | End: 2021-12-24
Attending: EMERGENCY MEDICINE
Payer: COMMERCIAL

## 2021-12-24 ENCOUNTER — APPOINTMENT (OUTPATIENT)
Dept: GENERAL RADIOLOGY | Age: 51
End: 2021-12-24
Attending: EMERGENCY MEDICINE
Payer: COMMERCIAL

## 2021-12-24 VITALS
RESPIRATION RATE: 12 BRPM | HEIGHT: 66 IN | WEIGHT: 160 LBS | BODY MASS INDEX: 25.71 KG/M2 | HEART RATE: 72 BPM | SYSTOLIC BLOOD PRESSURE: 148 MMHG | DIASTOLIC BLOOD PRESSURE: 99 MMHG | TEMPERATURE: 98 F | OXYGEN SATURATION: 100 %

## 2021-12-24 DIAGNOSIS — R07.89 CHEST WALL PAIN: Primary | ICD-10-CM

## 2021-12-24 LAB
ALBUMIN SERPL-MCNC: 3.9 G/DL (ref 3.5–5)
ALBUMIN/GLOB SERPL: 1.2 {RATIO} (ref 1.2–3.5)
ALP SERPL-CCNC: 89 U/L (ref 50–136)
ALT SERPL-CCNC: 24 U/L (ref 12–65)
ANION GAP SERPL CALC-SCNC: 5 MMOL/L (ref 7–16)
AST SERPL-CCNC: 20 U/L (ref 15–37)
BASOPHILS # BLD: 0 K/UL (ref 0–0.2)
BASOPHILS NFR BLD: 1 % (ref 0–2)
BILIRUB SERPL-MCNC: 0.3 MG/DL (ref 0.2–1.1)
BUN SERPL-MCNC: 10 MG/DL (ref 6–23)
CALCIUM SERPL-MCNC: 9.1 MG/DL (ref 8.3–10.4)
CHLORIDE SERPL-SCNC: 110 MMOL/L (ref 98–107)
CO2 SERPL-SCNC: 28 MMOL/L (ref 21–32)
CREAT SERPL-MCNC: 0.93 MG/DL (ref 0.6–1)
DIFFERENTIAL METHOD BLD: ABNORMAL
EOSINOPHIL # BLD: 0.1 K/UL (ref 0–0.8)
EOSINOPHIL NFR BLD: 2 % (ref 0.5–7.8)
ERYTHROCYTE [DISTWIDTH] IN BLOOD BY AUTOMATED COUNT: 12.6 % (ref 11.9–14.6)
GLOBULIN SER CALC-MCNC: 3.3 G/DL (ref 2.3–3.5)
GLUCOSE SERPL-MCNC: 93 MG/DL (ref 65–100)
HCT VFR BLD AUTO: 35.4 % (ref 35.8–46.3)
HGB BLD-MCNC: 11.2 G/DL (ref 11.7–15.4)
IMM GRANULOCYTES # BLD AUTO: 0 K/UL (ref 0–0.5)
IMM GRANULOCYTES NFR BLD AUTO: 1 % (ref 0–5)
LIPASE SERPL-CCNC: 139 U/L (ref 73–393)
LYMPHOCYTES # BLD: 1.5 K/UL (ref 0.5–4.6)
LYMPHOCYTES NFR BLD: 40 % (ref 13–44)
MAGNESIUM SERPL-MCNC: 1.9 MG/DL (ref 1.8–2.4)
MCH RBC QN AUTO: 28.5 PG (ref 26.1–32.9)
MCHC RBC AUTO-ENTMCNC: 31.6 G/DL (ref 31.4–35)
MCV RBC AUTO: 90.1 FL (ref 79.6–97.8)
MONOCYTES # BLD: 0.3 K/UL (ref 0.1–1.3)
MONOCYTES NFR BLD: 7 % (ref 4–12)
NEUTS SEG # BLD: 1.9 K/UL (ref 1.7–8.2)
NEUTS SEG NFR BLD: 50 % (ref 43–78)
NRBC # BLD: 0 K/UL (ref 0–0.2)
PLATELET # BLD AUTO: 259 K/UL (ref 150–450)
PMV BLD AUTO: 10.3 FL (ref 9.4–12.3)
POTASSIUM SERPL-SCNC: 3.9 MMOL/L (ref 3.5–5.1)
PROT SERPL-MCNC: 7.2 G/DL (ref 6.3–8.2)
RBC # BLD AUTO: 3.93 M/UL (ref 4.05–5.2)
SODIUM SERPL-SCNC: 143 MMOL/L (ref 136–145)
TROPONIN-HIGH SENSITIVITY: 13 PG/ML (ref 0–14)
TROPONIN-HIGH SENSITIVITY: 14.7 PG/ML (ref 0–14)
WBC # BLD AUTO: 3.7 K/UL (ref 4.3–11.1)

## 2021-12-24 PROCEDURE — 84484 ASSAY OF TROPONIN QUANT: CPT

## 2021-12-24 PROCEDURE — 93005 ELECTROCARDIOGRAM TRACING: CPT | Performed by: EMERGENCY MEDICINE

## 2021-12-24 PROCEDURE — 99284 EMERGENCY DEPT VISIT MOD MDM: CPT

## 2021-12-24 PROCEDURE — 85025 COMPLETE CBC W/AUTO DIFF WBC: CPT

## 2021-12-24 PROCEDURE — 83735 ASSAY OF MAGNESIUM: CPT

## 2021-12-24 PROCEDURE — 83690 ASSAY OF LIPASE: CPT

## 2021-12-24 PROCEDURE — 80053 COMPREHEN METABOLIC PANEL: CPT

## 2021-12-24 PROCEDURE — 94760 N-INVAS EAR/PLS OXIMETRY 1: CPT

## 2021-12-24 PROCEDURE — 71045 X-RAY EXAM CHEST 1 VIEW: CPT

## 2021-12-24 RX ORDER — SODIUM CHLORIDE 0.9 % (FLUSH) 0.9 %
5-10 SYRINGE (ML) INJECTION AS NEEDED
Status: DISCONTINUED | OUTPATIENT
Start: 2021-12-24 | End: 2021-12-24 | Stop reason: HOSPADM

## 2021-12-24 RX ORDER — SODIUM CHLORIDE 0.9 % (FLUSH) 0.9 %
5-10 SYRINGE (ML) INJECTION EVERY 8 HOURS
Status: DISCONTINUED | OUTPATIENT
Start: 2021-12-24 | End: 2021-12-24 | Stop reason: HOSPADM

## 2021-12-24 NOTE — ED PROVIDER NOTES
50-year-old female presenting for pain on the left side of her chest that radiates down her arm that she describes as electric shocks. She thinks it is her AICD firing. She tells me its been happening repeatedly throughout the day. She describes as a buzzing sensation that runs down her arm will last a couple of minutes at a time. She did not describe as a instantaneous side or pressure in her chest.  She denies any other symptoms such as shortness of breath, nausea or vomiting. She has no other symptoms at this time. She actually tells me this is been going on ever since they put it in in October but is become more frequent and thinks that it happened too many times today. She cannot think of anything that makes it happen or makes it stop. When asked if pressing on the area would cause the pain to happen the patient winced with just light touch to the skin. AICD problem   Associated symptoms include arthralgias. Past Medical History:   Diagnosis Date    Arthritis     knees    Chronic pain     knees    GERD (gastroesophageal reflux disease)     controlled with daily omeprazole    Hypertension     controlled with med    Ill-defined condition     Malaise and fatigue        Past Surgical History:   Procedure Laterality Date    HX HYSTERECTOMY  late 1990's    HX LAP CHOLECYSTECTOMY  1990's    HX ORTHOPAEDIC  1990's    left knee arthroscopy    HX TONSILLECTOMY  late 1990's    HX TUBAL LIGATION  1990's         No family history on file.     Social History     Socioeconomic History    Marital status:      Spouse name: Not on file    Number of children: Not on file    Years of education: Not on file    Highest education level: Not on file   Occupational History    Not on file   Tobacco Use    Smoking status: Never Smoker    Smokeless tobacco: Never Used   Substance and Sexual Activity    Alcohol use: Yes     Comment: occasional glass wine    Drug use: No     Types: Prescription, OTC    Sexual activity: Not on file   Other Topics Concern    Not on file   Social History Narrative    Not on file     Social Determinants of Health     Financial Resource Strain:     Difficulty of Paying Living Expenses: Not on file   Food Insecurity:     Worried About Running Out of Food in the Last Year: Not on file    Kacy of Food in the Last Year: Not on file   Transportation Needs:     Lack of Transportation (Medical): Not on file    Lack of Transportation (Non-Medical): Not on file   Physical Activity:     Days of Exercise per Week: Not on file    Minutes of Exercise per Session: Not on file   Stress:     Feeling of Stress : Not on file   Social Connections:     Frequency of Communication with Friends and Family: Not on file    Frequency of Social Gatherings with Friends and Family: Not on file    Attends Roman [employer] Services: Not on file    Active Member of [address] [place] [address] [employer] or Organizations: Not on file    Attends Club or Organization Meetings: Not on file    Marital Status: Not on file   Intimate Partner Violence:     Fear of Current or Ex-Partner: Not on file    Emotionally Abused: Not on file    Physically Abused: Not on file    Sexually Abused: Not on file   Housing Stability:     Unable to Pay for Housing in the Last Year: Not on file    Number of [place] in the Last Year: Not on file    Unstable Housing in the Last Year: Not on file         ALLERGIES: Lortab [hydrocodone-acetaminophen], Penicillins, and Sulfa (sulfonamide antibiotics)    Review of Systems   Musculoskeletal: Positive for arthralgias. All other systems reviewed and are negative. Vitals:    12/24/21 1530   BP: (!) 146/89   Pulse: 81   Resp: 18   Temp: 98 °F (36.7 °C)   SpO2: 97%   Weight: 72.6 kg (160 lb)   Height: 5' 5.5\" (1.664 m)            Physical Exam  Vitals and nursing note reviewed. Constitutional:       Appearance: She is well-developed. HENT:      Head: Normocephalic and atraumatic.    Eyes: Conjunctiva/sclera: Conjunctivae normal.      Pupils: Pupils are equal, round, and reactive to light. Cardiovascular:      Rate and Rhythm: Normal rate and regular rhythm. Comments: Tenderness to palpation over the AICD with just light touch, no fluctuance, no erythema, no drainage  Pulmonary:      Effort: Pulmonary effort is normal.      Breath sounds: Normal breath sounds. Abdominal:      General: Bowel sounds are normal.      Palpations: Abdomen is soft. Musculoskeletal:         General: Normal range of motion. Cervical back: Normal range of motion and neck supple. Skin:     General: Skin is warm and dry. Neurological:      Mental Status: She is alert and oriented to person, place, and time. MDM  Number of Diagnoses or Management Options  Chest wall pain  Diagnosis management comments: 77-year-old female presenting for pain over her AICD. She is thinking that its the device shocking her. My suspicion is this is nerve impingement the patient is having some sort of hyperesthetic issue. I attempted to have the patient device interrogated much earlier in her course but apparently unbeknownst to nursing the transmitting device is not functioning so we have now had to call in the representative from the company.        Amount and/or Complexity of Data Reviewed  Clinical lab tests: ordered and reviewed (Results for orders placed or performed during the hospital encounter of 12/24/21  -TROPONIN-HIGH SENSITIVITY:        Result                      Value             Ref Range           Troponin-High Sensitiv*     13.0              0 - 14 pg/mL   -CBC WITH AUTOMATED DIFF:        Result                      Value             Ref Range           WBC                         3.7 (L)           4.3 - 11.1 K*       RBC                         3.93 (L)          4.05 - 5.2 M*       HGB                         11.2 (L)          11.7 - 15.4 *       HCT                         35.4 (L)          35.8 - 46.3 %       MCV                         90.1              79.6 - 97.8 *       MCH                         28.5              26.1 - 32.9 *       MCHC                        31.6              31.4 - 35.0 *       RDW                         12.6              11.9 - 14.6 %       PLATELET                    259               150 - 450 K/*       MPV                         10.3              9.4 - 12.3 FL       ABSOLUTE NRBC               0.00              0.0 - 0.2 K/*       DF                          AUTOMATED                             NEUTROPHILS                 50                43 - 78 %           LYMPHOCYTES                 40                13 - 44 %           MONOCYTES                   7                 4.0 - 12.0 %        EOSINOPHILS                 2                 0.5 - 7.8 %         BASOPHILS                   1                 0.0 - 2.0 %         IMMATURE GRANULOCYTES       1                 0.0 - 5.0 %         ABS. NEUTROPHILS            1.9               1.7 - 8.2 K/*       ABS. LYMPHOCYTES            1.5               0.5 - 4.6 K/*       ABS. MONOCYTES              0.3               0.1 - 1.3 K/*       ABS. EOSINOPHILS            0.1               0.0 - 0.8 K/*       ABS. BASOPHILS              0.0               0.0 - 0.2 K/*       ABS. IMM.  GRANS.            0.0               0.0 - 0.5 K/*  -METABOLIC PANEL, COMPREHENSIVE:        Result                      Value             Ref Range           Sodium                      143               136 - 145 mm*       Potassium                   3.9               3.5 - 5.1 mm*       Chloride                    110 (H)           98 - 107 mmo*       CO2                         28                21 - 32 mmol*       Anion gap                   5 (L)             7 - 16 mmol/L       Glucose                     93                65 - 100 mg/*       BUN                         10                6 - 23 MG/DL        Creatinine                  0.93              0.6 - 1.0 MG*       GFR est AA                  >60               >60 ml/min/1*       GFR est non-AA              >60               >60 ml/min/1*       Calcium                     9.1               8.3 - 10.4 M*       Bilirubin, total            0.3               0.2 - 1.1 MG*       ALT (SGPT)                  24                12 - 65 U/L         AST (SGOT)                  20                15 - 37 U/L         Alk. phosphatase            89                50 - 136 U/L        Protein, total              7.2               6.3 - 8.2 g/*       Albumin                     3.9               3.5 - 5.0 g/*       Globulin                    3.3               2.3 - 3.5 g/*       A-G Ratio                   1.2               1.2 - 3.5      -LIPASE:        Result                      Value             Ref Range           Lipase                      139               73 - 393 U/L   -MAGNESIUM:        Result                      Value             Ref Range           Magnesium                   1.9               1.8 - 2.4 mg*  )  Tests in the radiology section of CPT®: ordered and reviewed (XR CHEST PORT    Result Date: 12/24/2021  EXAMINATION: XR CHEST PORT 12/24/2021 4:09 PM ACCESSION NUMBER: [id number] COMPARISON: 10/27/2021 INDICATION: Chest Pain Patient arrives ambulatory to triage with mask in place. Patient reports her defibrillator has been shocking her yesterday and today, \"too many times to count. \"  TECHNIQUE: A single view of the chest was obtained. FINDINGS: Support Devices: *  None Cardiac Silhouette: Cardiac silhouette is enlarged, but stable. Pacer/AICD leads overlie the right heart. Mediastinum: Normal mediastinal contours. Lungs: No airspace consolidation. No pneumothorax or sizable pleural effusion. Upper Abdomen: Normal Miscellaneous: No fracture or suspicious osseous lesion.      Stable cardiomegaly with no focal airspace consolidation.     )  Decide to obtain previous medical records or to obtain history from someone other than the patient: yes  Discuss the patient with other providers: yes    Risk of Complications, Morbidity, and/or Mortality  Presenting problems: high  Diagnostic procedures: moderate  Management options: moderate  General comments: I personally reviewed the patient's vital signs, laboratory tests, and/or radiological findings. I discussed these findings with the patient and their significance. I answered all questions and gave the patient clear return precautions. The patient was discharged from the emergency department in stable condition        Patient Progress  Patient progress: improved    ED Course as of 12/24/21 1914   Fri Dec 24, 2021   1802 Just found out that the transmitting apparatus for a [employer] AICD is not operating and we will have to call the tech and. [JS]   [id number] [phone] Patient's blood work and x-ray are fine. Still awaiting interrogation to verify that the machine was not actually shocking the patient as she has had no episodes here in the emergency department of any kind of AICD firing. [JS]   1913 Device was interrogated and has never directly given the patient is shocked. This is some sort of nerve impingement. I will have the patient purchase some over-the-counter lidocaine patches and see if that helps.  [JS]      ED Course User Index  [JS] Marcia Fay MD       Procedures

## 2021-12-24 NOTE — ED TRIAGE NOTES
Patient arrives ambulatory to triage with mask in place. Patient reports her defibrillator has been shocking her yesterday and today, \"too many times to count. \"  Device is [employer] PAC.

## 2021-12-25 LAB
ATRIAL RATE: 72 BPM
CALCULATED P AXIS, ECG09: 56 DEGREES
CALCULATED R AXIS, ECG10: 13 DEGREES
CALCULATED T AXIS, ECG11: 78 DEGREES
DIAGNOSIS, 93000: NORMAL
P-R INTERVAL, ECG05: 158 MS
Q-T INTERVAL, ECG07: 376 MS
QRS DURATION, ECG06: 82 MS
QTC CALCULATION (BEZET), ECG08: 411 MS
VENTRICULAR RATE, ECG03: 72 BPM

## 2021-12-25 NOTE — ED NOTES
I have reviewed discharge instructions with the patient. The patient verbalized understanding. Patient left ED via Discharge Method: ambulatory to Home with . Opportunity for questions and clarification provided. Patient given 0 scripts. To continue your aftercare when you leave the hospital, you may receive an automated call from our care team to check in on how you are doing. This is a free service and part of our promise to provide the best care and service to meet your aftercare needs.  If you have questions, or wish to unsubscribe from this service please call [phone]. Thank you for Choosing our [address] [place] [address] Emergency Department.

## 2021-12-25 NOTE — DISCHARGE INSTRUCTIONS
Your device was interrogated and has never delivered a shock. This has to be some sort of nerve impingement. In the short-term try over-the-counter lidocaine patches. I think they come in 3% patches. Apply them directly over the device per package instructions.

## 2022-06-29 ENCOUNTER — OFFICE VISIT (OUTPATIENT)
Dept: CARDIOLOGY CLINIC | Age: 52
End: 2022-06-29
Payer: COMMERCIAL

## 2022-06-29 VITALS
HEIGHT: 65 IN | HEART RATE: 68 BPM | WEIGHT: 160.6 LBS | DIASTOLIC BLOOD PRESSURE: 80 MMHG | SYSTOLIC BLOOD PRESSURE: 110 MMHG | BODY MASS INDEX: 26.76 KG/M2

## 2022-06-29 DIAGNOSIS — I42.8 NICM (NONISCHEMIC CARDIOMYOPATHY) (HCC): Primary | ICD-10-CM

## 2022-06-29 DIAGNOSIS — R07.89 CHEST DISCOMFORT: ICD-10-CM

## 2022-06-29 PROCEDURE — 99214 OFFICE O/P EST MOD 30 MIN: CPT | Performed by: INTERNAL MEDICINE

## 2022-06-29 RX ORDER — METOPROLOL SUCCINATE 25 MG/1
12.5 TABLET, EXTENDED RELEASE ORAL DAILY
Qty: 90 TABLET | Refills: 3 | Status: SHIPPED | OUTPATIENT
Start: 2022-06-29

## 2022-06-29 ASSESSMENT — ENCOUNTER SYMPTOMS: SHORTNESS OF BREATH: 1

## 2022-06-29 NOTE — PATIENT INSTRUCTIONS
Patient Education        Heart Failure: Care Instructions  Your Care Instructions     Heart failure occurs when your heart does not pump as much blood as the body needs. Failure does not mean that the heart has stopped pumping but rather that it is not pumping as well as it should. Over time, this causes fluid buildup in your lungs and other parts of your body. Fluid buildup can cause shortness of breath, fatigue, swollen ankles, and other problems. By taking medicines regularly, reducing sodium (salt) in your diet, checking your weight every day,and making lifestyle changes, you can feel better and live longer. Follow-up care is a key part of your treatment and safety. Be sure to make and go to all appointments, and call your doctor if you are having problems. It's also a good idea to know your test results and keep alist of the medicines you take. How can you care for yourself at home? Medicines     Be safe with medicines. Take your medicines exactly as prescribed. Call your doctor if you think you are having a problem with your medicine.      Do not take any vitamins, over-the-counter medicine, or herbal products without talking to your doctor first. Georgie Him not take ibuprofen (Advil or Motrin) and naproxen (Aleve) without talking to your doctor first. They could make your heart failure worse.      You may take some of the following medicine. ? Angiotensin-converting enzyme inhibitors (ACEIs) or angiotensin II receptor blockers (ARBs) reduce the heart's workload, lower blood pressure, and reduce swelling. Taking an ACEI or ARB may lower your chance of needing to be hospitalized. ? Beta-blockers can slow heart rate, decrease blood pressure, and improve your condition. Taking a beta-blocker may lower your chance of needing to be hospitalized. ? Diuretics, also called water pills, reduce swelling. You will get more details on the specific medicines your doctor prescribes.   Diet     Your doctor may suggest that you limit sodium. Your doctor can tell you how much sodium is right for you. An example is less than 3,000 mg a day. This includes all the salt you eat in cooking or in packaged foods. People get most of their sodium from processed foods. Fast food and restaurant meals also tend to be very high in sodium.      Ask your doctor how much liquid you can drink each day. You may have to limit liquids. Weight     Weigh yourself without clothing at the same time each day. Record your weight. Call your doctor if you have a sudden weight gain, such as more than 2 to 3 pounds in a day or 5 pounds in a week. (Your doctor may suggest a different range of weight gain.) A sudden weight gain may mean that your heart failure is getting worse. Activity level     Start light exercise (if your doctor says it is okay). Even if you can only do a small amount, exercise will help you get stronger, have more energy, and manage your weight and your stress. Walking is an easy way to get exercise. Start out by walking a little more than you did before. Bit by bit, increase the amount you walk.      When you exercise, watch for signs that your heart is working too hard. You are pushing yourself too hard if you cannot talk while you are exercising. If you become short of breath or dizzy or have chest pain, stop, sit down, and rest.      If you feel \"wiped out\" the day after you exercise, walk slower or for a shorter distance until you can work up to a better pace.      Get enough rest at night. Sleeping with 1 or 2 pillows under your upper body and head may help you breathe easier. Lifestyle changes     Do not smoke. Smoking can make a heart condition worse. If you need help quitting, talk to your doctor about stop-smoking programs and medicines. These can increase your chances of quitting for good.  Quitting smoking may be the most important step you can take to protect your heart.      Limit alcohol to 2 drinks a day for men and 1 drink a day for women. Too much alcohol can cause health problems.      Avoid getting sick from colds and the flu. Get a pneumococcal vaccine shot. If you have had one before, ask your doctor whether you need another dose. Get a flu shot each year. If you must be around people with colds or the flu, wash your hands often. When should you call for help? Call 911  if you have symptoms of sudden heart failure such as:     You have severe trouble breathing.      You cough up pink, foamy mucus.      You have a new irregular or rapid heartbeat. Call your doctor now or seek immediate medical care if:     You have new or increased shortness of breath.      You are dizzy or lightheaded, or you feel like you may faint.      You have sudden weight gain, such as more than 2 to 3 pounds in a day or 5 pounds in a week. (Your doctor may suggest a different range of weight gain.)      You have increased swelling in your legs, ankles, or feet.      You are suddenly so tired or weak that you cannot do your usual activities. Watch closely for changes in your health, and be sure to contact your doctor ifyou develop new symptoms. Where can you learn more? Go to https://[employer]. org and sign in to your [employer]Â® account. Enter N868 in the [employer] box to learn more about \"Heart Failure: Care Instructions. \"     If you do not have an account, please click on the \"Sign Up Now\" link. Current as of: January 10, 2022               Content Version: 13.3  © 2006-2022 [employer]. Care instructions adapted under license by [facility] ([facility]). If you have questions about a medical condition or this instruction, always ask your healthcare professional. [address] any warranty or liability for your use of this information. Please visit [email or web address]. org for more information regarding cardiovascular disease prevention and treatment.

## 2022-06-29 NOTE — PROGRESS NOTES
[facility] CARDIOLOGY  [address] [facility], [address]  PHONE: [phone]      22    NAME:  Nuris Agosto  : 1970  MRN: [id number]         SUBJECTIVE:   Nuris Agosto is a 45 y.o. female seen for a follow up visit regarding the following:     Chief Complaint   Patient presents with    Cardiomyopathy     echo results    6 Month Follow-Up            HPI:  Follow up  Cardiomyopathy (echo results) and 6 Month Follow-Up   . Follow up NICM with HFrEF,   Poorly tolerant of meds with hypotension but thus far has tolerated spironolactone  and entresto. Previously BB intolerant d/t symptomatic bradycardia. (now has back up pacing via primary prevention ICD followed by Dr Joey Prajapati) and last visit agreed to attempt again if EF remained < 40%, which it does, now 35-40%. She has had more fatigue, some chest pain when she takes a deep breath but that appears to be better as of today. She has not been exercising because of the fatigue. No ankle edema.                   Past cardiac history:   Intolerant of beta blockade   2015 -  normal perfusion after 9 minutes, stage III, but was read as an EF of 43%, done for fatigue and dyspnea   May 2015 - Echo - EF confirmed 40-45%, no valve disease, RVSP 22, diastolic function could not be graded. 2015 - Cath - normal coronary arteries, EF low normal 50%, LVEDP 12   Oct 2016 - EF 45%, no valve disease, normal 24 hour monitor, no symptom correlation   2017 - no change   2018 - 43%   2020 - EF 32%   Aug 2020- EF 35%   Oct 2021- primary prevention ICD - Dr Joey Prajapati ([address] [place] [address])  2022- EF 35-40%               Past Medical History, Past Surgical History, Family history, Social History, and Medications were all reviewed with the patient today and updated as necessary. Prior to Admission medications    Medication Sig Start Date End Date Taking?  Authorizing Provider   metoprolol succinate (TOPROL XL) 25 MG extended release tablet Take 0.5 tablets by mouth daily 6/29/22  Yes Rose Lozano MD   COD LIVER OIL PO Take by mouth   Yes Ar Automatic Reconciliation   celecoxib (CELEBREX) 100 MG capsule as needed 9/4/21  Yes Ar Automatic Reconciliation   cetirizine (ZYRTEC) 10 MG tablet Take 10 mg by mouth as needed   Yes Ar Automatic Reconciliation   Cholecalciferol 50 MCG (2000 UT) CAPS Take by mouth 2 times daily   Yes Ar Automatic Reconciliation   diclofenac sodium (VOLTAREN) 1 % GEL Apply 1-2 g topically 3 times daily 6/13/21  Yes Ar Automatic Reconciliation   dicyclomine (BENTYL) 10 MG capsule Take 10 mg by mouth 4 times daily as needed   Yes Ar Automatic Reconciliation   FLUoxetine (PROZAC) 40 MG capsule Take by mouth daily   Yes Ar Automatic Reconciliation   fluticasone (FLONASE) 50 MCG/ACT nasal spray 1 spray by Nasal route 2 times daily 1/19/21  Yes Ar Automatic Reconciliation   guaiFENesin 400 MG tablet Take 400 mg by mouth as needed   Yes Ar Automatic Reconciliation   itraconazole (SPORANOX) 100 MG capsule Take 200 mg by mouth as needed   Yes Ar Automatic Reconciliation   linaclotide (LINZESS) 290 MCG CAPS capsule Take 290 mcg by mouth daily 4/8/19  Yes Ar Automatic Reconciliation   melatonin 10 MG CAPS capsule Take 10 mg by mouth   Yes Ar Automatic Reconciliation   omeprazole (PRILOSEC) 40 MG delayed release capsule Take 40 mg by mouth daily   Yes Ar Automatic Reconciliation   polyethyl glycol-propyl glycol 0.4-0.3 % (SYSTANE) 0.4-0.3 % ophthalmic solution 1 drop as needed   Yes Ar Automatic Reconciliation   sacubitril-valsartan (ENTRESTO) 49-51 MG per tablet Take 1 tablet by mouth 2 times daily 12/13/21  Yes Ar Automatic Reconciliation   sodium chloride (OCEAN) 0.65 % nasal spray 2 drops   Yes Ar Automatic Reconciliation   Simethicone 125 MG CAPS Take 125 mg by mouth 4 times daily as needed   Yes Ar Automatic Reconciliation   spironolactone (ALDACTONE) 25 MG tablet TAKE 1 TABLET BY MOUTH EVERY DAY 12/13/21  Yes Ar Automatic Reconciliation   triamcinolone (NASACORT) 55 MCG/ACT nasal inhaler 2 sprays daily   Yes Ar Automatic Reconciliation     Allergies   Allergen Reactions    Penicillins Swelling    Sulfa Antibiotics Other (See Comments)    Hydrocodone-Acetaminophen Nausea And Vomiting     Past Medical History:   Diagnosis Date    Arthritis     knees    Chronic pain     knees    GERD (gastroesophageal reflux disease)     controlled with daily omeprazole    Hypertension     controlled with med    Ill-defined condition     Malaise and fatigue      Past Surgical History:   Procedure Laterality Date    CHOLECYSTECTOMY, LAPAROSCOPIC  1990's    HYSTERECTOMY  late 1990's    ORTHOPEDIC SURGERY  1990's    left knee arthroscopy    TONSILLECTOMY  late 1990's    TUBAL LIGATION  1990's     No family history on file. Social History     Tobacco Use    Smoking status: Never Smoker    Smokeless tobacco: Never Used   Substance Use Topics    Alcohol use: Yes       ROS:    Review of Systems   Constitutional: Positive for malaise/fatigue. Cardiovascular: Positive for chest pain. Negative for leg swelling. Respiratory: Positive for shortness of breath. PHYSICAL EXAM:   /80   Pulse 68   Ht 5' 5\" (1.651 m)   Wt 160 lb 9.6 oz (72.8 kg)   BMI 26.73 kg/m²      Wt Readings from Last 3 Encounters:   06/29/22 160 lb 9.6 oz (72.8 kg)   06/24/22 160 lb (72.6 kg)   12/13/21 166 lb (75.3 kg)     BP Readings from Last 3 Encounters:   06/29/22 110/80   12/13/21 124/76   10/12/21 (!) 148/100     Pulse Readings from Last 3 Encounters:   06/29/22 68   12/13/21 70   10/12/21 (!) 49           Physical Exam  Constitutional:       Appearance: Normal appearance. HENT:      Head: Normocephalic and atraumatic. Eyes:      Conjunctiva/sclera: Conjunctivae normal.   Neck:      Vascular: No carotid bruit. Cardiovascular:      Rate and Rhythm: Normal rate and regular rhythm.       Heart sounds: No murmur heard.  No friction rub. No gallop. Pulmonary:      Effort: No respiratory distress. Breath sounds: No wheezing or rales. Musculoskeletal:         General: No swelling. Cervical back: Neck supple. Skin:     General: Skin is warm and dry. Neurological:      General: No focal deficit present. Mental Status: She is alert. Psychiatric:         Mood and Affect: Mood normal.         Behavior: Behavior normal.         Medical problems and test results were reviewed with the patient today. DATA REVIEW      BMP  Lab Results   Component Value Date     12/24/2021    K 3.9 12/24/2021     12/24/2021    CO2 28 12/24/2021    BUN 10 12/24/2021    CREATININE 0.93 12/24/2021    GLUCOSE 93 12/24/2021    CALCIUM 9.1 12/24/2021          EKG        CXR/IMAGING        DEVICE INTERROGATION        OUTSIDE RECORDS REVIEW    Records from outside providers have been reviewed and summarized as noted in the HPI, past history and data review sections of this note, and reviewed with patient. .       ASSESSMENT and PLAN    Duke Mixon was seen today for cardiomyopathy and 6 month follow-up. Diagnoses and all orders for this visit:    NICM (nonischemic cardiomyopathy) ([facility] [address]    Chest discomfort    Other orders  -     metoprolol succinate (TOPROL XL) 25 MG extended release tablet; Take 0.5 tablets by mouth daily          IMPRESSION:    EF remains < 40%. Now with back up lalo pacing is willing to re attempt BB therapy which we discussed at length. Very low dose since she's not tolerated previously and was honest she may not tolerate even now but discussed importance of trying to get on max tolerated GDMT. Return in 2 weeks for BP check and assess tolerance. If not tolerating, continue and plan to add SGLT2i.   If tolerated, continue and add SGLTi therapy (gave her information for [place]), discussed that at length as well, rare risk of peroneal infection and importance of blood work after starting meds.      Offered referral for counseling and discussed common association of depression with chronic disease. For now she wishes to stay the course but the offer is ever present        Return in about 3 months (around 9/29/2022) for 50 VISIT 2 WEEKS FOR BETA BLOCKER TITRATION. Thank you for allowing me to participate in this patient's care. Please call or contact me if there are any questions or concerns regarding the above.       Eugenio Garland MD  06/29/22  10:22 AM

## 2022-07-14 ENCOUNTER — NURSE ONLY (OUTPATIENT)
Dept: CARDIOLOGY CLINIC | Age: 52
End: 2022-07-14

## 2022-07-14 VITALS
HEIGHT: 65 IN | HEART RATE: 66 BPM | SYSTOLIC BLOOD PRESSURE: 110 MMHG | DIASTOLIC BLOOD PRESSURE: 70 MMHG | BODY MASS INDEX: 26.16 KG/M2 | WEIGHT: 157 LBS

## 2022-07-14 DIAGNOSIS — R00.2 PALPITATIONS: Primary | ICD-10-CM

## 2022-07-14 NOTE — PROGRESS NOTES
Pt stated that she is still weak. Spoke with doctor Morris and informed    of the Bp and the Heart rate. She stated to informed the pt to keep taking the toprolol and that if she's finished her research about farxiga to call and she will start it. Pt wants the dr to call her also wanted to ask the doctor can she take Celebrex and Voltaren gel and magnesium.  .Stated that the pt can hold the satin that her PCP put her on for a two weeks and do not take Celebrex and use the Voltaren gel occasionally

## 2022-07-15 ENCOUNTER — TELEPHONE (OUTPATIENT)
Dept: CARDIOLOGY CLINIC | Age: 52
End: 2022-07-15

## 2022-07-19 ENCOUNTER — TELEPHONE (OUTPATIENT)
Dept: CARDIOLOGY CLINIC | Age: 52
End: 2022-07-19

## 2022-07-19 NOTE — TELEPHONE ENCOUNTER
Placed call to pt and reached voicemail. Left message making pt aware per Dr Ulysses [place] I know of no significant interaction. Rx would come from PCP.  Left contact information for pt to follow up with any questions

## 2022-07-19 NOTE — TELEPHONE ENCOUNTER
Patient called to let Dr. Grace Hussein know the medication she was asking about is not [place]. The medication she is asking about is amberen. Please call patient and advise.

## 2022-08-30 PROCEDURE — 93295 DEV INTERROG REMOTE 1/2/MLT: CPT | Performed by: INTERNAL MEDICINE

## 2022-08-30 PROCEDURE — 93296 REM INTERROG EVL PM/IDS: CPT | Performed by: INTERNAL MEDICINE

## 2022-09-27 ENCOUNTER — TELEPHONE (OUTPATIENT)
Dept: CARDIOLOGY CLINIC | Age: 52
End: 2022-09-27

## 2022-09-27 ENCOUNTER — OFFICE VISIT (OUTPATIENT)
Dept: CARDIOLOGY CLINIC | Age: 52
End: 2022-09-27
Payer: COMMERCIAL

## 2022-09-27 VITALS
BODY MASS INDEX: 26.96 KG/M2 | DIASTOLIC BLOOD PRESSURE: 82 MMHG | SYSTOLIC BLOOD PRESSURE: 116 MMHG | HEART RATE: 60 BPM | WEIGHT: 162 LBS

## 2022-09-27 DIAGNOSIS — Z95.810 AICD (AUTOMATIC CARDIOVERTER/DEFIBRILLATOR) PRESENT: ICD-10-CM

## 2022-09-27 DIAGNOSIS — I50.22 CHRONIC SYSTOLIC HEART FAILURE (HCC): ICD-10-CM

## 2022-09-27 DIAGNOSIS — I42.8 NICM (NONISCHEMIC CARDIOMYOPATHY) (HCC): Primary | ICD-10-CM

## 2022-09-27 DIAGNOSIS — N18.31 STAGE 3A CHRONIC KIDNEY DISEASE (HCC): ICD-10-CM

## 2022-09-27 DIAGNOSIS — I95.89 IATROGENIC HYPOTENSION: ICD-10-CM

## 2022-09-27 DIAGNOSIS — R00.2 PALPITATIONS: ICD-10-CM

## 2022-09-27 PROCEDURE — 99214 OFFICE O/P EST MOD 30 MIN: CPT | Performed by: INTERNAL MEDICINE

## 2022-09-27 ASSESSMENT — ENCOUNTER SYMPTOMS: SHORTNESS OF BREATH: 0

## 2022-09-27 NOTE — PATIENT INSTRUCTIONS
Patient Education        Heart Failure: Care Instructions  Your Care Instructions     Heart failure occurs when your heart does not pump as much blood as the body needs. Failure does not mean that the heart has stopped pumping but rather that it is not pumping as well as it should. Over time, this causes fluid buildup in your lungs and other parts of your body. Fluid buildup can cause shortness of breath, fatigue, swollen ankles, and other problems. By taking medicines regularly, reducing sodium (salt) in your diet, checking your weight every day, and making lifestyle changes, you can feel better and live longer. Follow-up care is a key part of your treatment and safety. Be sure to make and go to all appointments, and call your doctor if you are having problems. It's also a good idea to know your test results and keep a list of the medicines you take. How can you care for yourself at home? Medicines    Be safe with medicines. Take your medicines exactly as prescribed. Call your doctor if you think you are having a problem with your medicine. Do not take any vitamins, over-the-counter medicine, or herbal products without talking to your doctor first. Liat Lucianoron not take ibuprofen (Advil or Motrin) and naproxen (Aleve) without talking to your doctor first. They could make your heart failure worse. You may take some of the following medicine. Angiotensin-converting enzyme inhibitors (ACEIs) or angiotensin II receptor blockers (ARBs) reduce the heart's workload, lower blood pressure, and reduce swelling. Taking an ACEI or ARB may lower your chance of needing to be hospitalized. Beta-blockers can slow heart rate, decrease blood pressure, and improve your condition. Taking a beta-blocker may lower your chance of needing to be hospitalized. Diuretics, also called water pills, reduce swelling. You will get more details on the specific medicines your doctor prescribes.   Diet    Your doctor may suggest that you limit sodium. Your doctor can tell you how much sodium is right for you. An example is less than 3,000 mg a day. This includes all the salt you eat in cooking or in packaged foods. People get most of their sodium from processed foods. Fast food and restaurant meals also tend to be very high in sodium. Ask your doctor how much liquid you can drink each day. You may have to limit liquids. Weight    Weigh yourself without clothing at the same time each day. Record your weight. Call your doctor if you have a sudden weight gain, such as more than 2 to 3 pounds in a day or 5 pounds in a week. (Your doctor may suggest a different range of weight gain.) A sudden weight gain may mean that your heart failure is getting worse. Activity level    Start light exercise (if your doctor says it is okay). Even if you can only do a small amount, exercise will help you get stronger, have more energy, and manage your weight and your stress. Walking is an easy way to get exercise. Start out by walking a little more than you did before. Bit by bit, increase the amount you walk. When you exercise, watch for signs that your heart is working too hard. You are pushing yourself too hard if you cannot talk while you are exercising. If you become short of breath or dizzy or have chest pain, stop, sit down, and rest.     If you feel \"wiped out\" the day after you exercise, walk slower or for a shorter distance until you can work up to a better pace. Get enough rest at night. Sleeping with 1 or 2 pillows under your upper body and head may help you breathe easier. Lifestyle changes    Do not smoke. Smoking can make a heart condition worse. If you need help quitting, talk to your doctor about stop-smoking programs and medicines. These can increase your chances of quitting for good. Quitting smoking may be the most important step you can take to protect your heart. Limit alcohol to 2 drinks a day for men and 1 drink a day for women. Too much alcohol can cause health problems. Avoid getting sick from colds and the flu. Get a pneumococcal vaccine shot. If you have had one before, ask your doctor whether you need another dose. Get a flu shot each year. If you must be around people with colds or the flu, wash your hands often. When should you call for help? Call 911  if you have symptoms of sudden heart failure such as: You have severe trouble breathing. You cough up pink, foamy mucus. You have a new irregular or rapid heartbeat. Call your doctor now or seek immediate medical care if:    You have new or increased shortness of breath. You are dizzy or lightheaded, or you feel like you may faint. You have sudden weight gain, such as more than 2 to 3 pounds in a day or 5 pounds in a week. (Your doctor may suggest a different range of weight gain.)     You have increased swelling in your legs, ankles, or feet. You are suddenly so tired or weak that you cannot do your usual activities. Watch closely for changes in your health, and be sure to contact your doctor if you develop new symptoms. Where can you learn more? Go to https://I[employer]chr[employer]uron. org and sign in to your On[employer] account. Enter X710 in the Byliner box to learn more about \"Heart Failure: Care Instructions. \"     If you do not have an account, please click on the \"Sign Up Now\" link. Current as of: January 10, 2022               Content Version: 13.4  © 2006-2022 [employer], [employer]. Care instructions adapted under license by [facility] ([place] [facility]). If you have questions about a medical condition or this instruction, always ask your healthcare professional. [place] [address] any warranty or liability for your use of this information.

## 2022-09-27 NOTE — TELEPHONE ENCOUNTER
Patient called stating that Dr Linda Haines prescribed the following RX for:    dapagliflozin (FARXIGA) 5 MG tablet. Patient states that she will need a pre-authorization from her insurance company. Please call and advise.

## 2022-09-27 NOTE — PROGRESS NOTES
[facility] CARDIOLOGY  [address], [address]  PHONE: [phone]      22    NAME:  Ruthy Palacio  : 1970  MRN: [id number]         SUBJECTIVE:   Ruthy Palacio is a 45 y.o. female seen for a follow up visit regarding the following:     Chief Complaint   Patient presents with    Cardiomyopathy            HPI:  Follow up  Cardiomyopathy   . Follow up NICM with HFrEF,   Poorly tolerant of meds with hypotension but thus far has tolerated spironolactone  and entresto. With back up lalo pacing, she was willing to start low dose BB last visit, and discussed SGLT2i therapy as a possibility for this follow up     She's tolerated the low dose BB. Her only issue today is some right shoulder pain, trying to see orthopedist.  Denies cp, dyspnea, palpitations, edema. Past cardiac history:   Poorly tolerant of beta blockade   2015 -  normal perfusion after 9 minutes, stage III, but was read as an EF of 43%, done for fatigue and dyspnea   May 2015 - Echo - EF confirmed 40-45%, no valve disease, RVSP 22, diastolic function could not be graded. 2015 - Cath - normal coronary arteries, EF low normal 50%, LVEDP 12   Oct 2016 - EF 45%, no valve disease, normal 24 hour monitor, no symptom correlation   2017 - no change   2018 - 43%   2020 - EF 32%   Aug 2020- EF 35%   Oct 2021- primary prevention ICD - Dr Daksha Callahan ([facility] [place])  2022- EF 35-40%                   Key CAD CHF Meds            metoprolol succinate (TOPROL XL) 25 MG extended release tablet (Taking)    Sig - Route:  Take 0.5 tablets by mouth daily - Oral    sacubitril-valsartan (ENTRESTO) 49-51 MG per tablet (Taking)    Class: Historical Med    spironolactone (ALDACTONE) 25 MG tablet (Taking)    Class: Historical Med              Past Medical History, Past Surgical History, Family history, Social History, and Medications were all reviewed with the patient today and updated as necessary. Prior to Admission medications    Medication Sig Start Date End Date Taking?  Authorizing Provider   dapagliflozin (FARXIGA) 5 MG tablet Take 1 tablet by mouth every morning 9/27/22  Yes Derrick Bailey MD   metoprolol succinate (TOPROL XL) 25 MG extended release tablet Take 0.5 tablets by mouth daily 6/29/22  Yes Derrick Bailey MD   COD LIVER OIL PO Take by mouth   Yes Ar Automatic Reconciliation   celecoxib (CELEBREX) 100 MG capsule as needed 9/4/21  Yes Ar Automatic Reconciliation   cetirizine (ZYRTEC) 10 MG tablet Take 10 mg by mouth as needed   Yes Ar Automatic Reconciliation   Cholecalciferol 50 MCG (2000 UT) CAPS Take by mouth 2 times daily   Yes Ar Automatic Reconciliation   diclofenac sodium (VOLTAREN) 1 % GEL Apply 1-2 g topically 3 times daily 6/13/21  Yes Ar Automatic Reconciliation   dicyclomine (BENTYL) 10 MG capsule Take 10 mg by mouth 4 times daily as needed   Yes Ar Automatic Reconciliation   FLUoxetine (PROZAC) 40 MG capsule Take by mouth daily   Yes Ar Automatic Reconciliation   fluticasone (FLONASE) 50 MCG/ACT nasal spray 1 spray by Nasal route 2 times daily 1/19/21  Yes Ar Automatic Reconciliation   guaiFENesin 400 MG tablet Take 400 mg by mouth as needed   Yes Ar Automatic Reconciliation   itraconazole (SPORANOX) 100 MG capsule Take 200 mg by mouth as needed   Yes Ar Automatic Reconciliation   linaclotide (LINZESS) 290 MCG CAPS capsule Take 290 mcg by mouth daily 4/8/19  Yes Ar Automatic Reconciliation   melatonin 10 MG CAPS capsule Take 10 mg by mouth   Yes Ar Automatic Reconciliation   omeprazole (PRILOSEC) 40 MG delayed release capsule Take 40 mg by mouth daily   Yes Ar Automatic Reconciliation   polyethyl glycol-propyl glycol 0.4-0.3 % (SYSTANE) 0.4-0.3 % ophthalmic solution 1 drop as needed   Yes Ar Automatic Reconciliation   sacubitril-valsartan (ENTRESTO) 49-51 MG per tablet Take 1 tablet by mouth 2 times daily 12/13/21  Yes Ar Automatic Reconciliation   sodium chloride (OCEAN) 0.65 % nasal spray 2 drops   Yes Ar Automatic Reconciliation   Simethicone 125 MG CAPS Take 125 mg by mouth 4 times daily as needed   Yes Ar Automatic Reconciliation   spironolactone (ALDACTONE) 25 MG tablet TAKE 1 TABLET BY MOUTH EVERY DAY 12/13/21  Yes Ar Automatic Reconciliation   triamcinolone (NASACORT) 55 MCG/ACT nasal inhaler 2 sprays daily   Yes Ar Automatic Reconciliation     Allergies   Allergen Reactions    Penicillins Swelling    Sulfa Antibiotics Other (See Comments)    Hydrocodone-Acetaminophen Nausea And Vomiting     Past Medical History:   Diagnosis Date    Arthritis     knees    Chronic pain     knees    GERD (gastroesophageal reflux disease)     controlled with daily omeprazole    Hypertension     controlled with med    Ill-defined condition     Malaise and fatigue     Stage 3a chronic kidney disease ([facility] [address] 9/27/2022     Past Surgical History:   Procedure Laterality Date    CHOLECYSTECTOMY, LAPAROSCOPIC  1990's    HYSTERECTOMY (CERVIX STATUS UNKNOWN)  late 1990's    ORTHOPEDIC SURGERY  1990's    left knee arthroscopy    TONSILLECTOMY  late 1990's    TUBAL LIGATION  1990's     History reviewed. No pertinent family history. Social History     Tobacco Use    Smoking status: Never    Smokeless tobacco: Never   Substance Use Topics    Alcohol use: Yes       ROS:    Review of Systems   Cardiovascular:  Negative for chest pain, leg swelling and palpitations. Respiratory:  Negative for shortness of breath. Musculoskeletal:  Positive for joint pain.         PHYSICAL EXAM:   /82   Pulse 60   Wt 162 lb (73.5 kg) Comment: with shoes  BMI 26.96 kg/m²      Wt Readings from Last 3 Encounters:   09/27/22 162 lb (73.5 kg)   07/14/22 157 lb (71.2 kg)   06/29/22 160 lb 9.6 oz (72.8 kg)     BP Readings from Last 3 Encounters:   09/27/22 116/82   07/14/22 110/70   06/29/22 110/80     Pulse Readings from Last 3 Encounters:   09/27/22 60   07/14/22 66   06/29/22 68 Physical Exam  Constitutional:       Appearance: Normal appearance. HENT:      Head: Normocephalic and atraumatic. Eyes:      Conjunctiva/sclera: Conjunctivae normal.   Neck:      Vascular: No carotid bruit. Cardiovascular:      Rate and Rhythm: Normal rate and regular rhythm. Heart sounds: No murmur heard. No friction rub. No gallop. Pulmonary:      Effort: No respiratory distress. Breath sounds: No wheezing or rales. Musculoskeletal:         General: No swelling. Cervical back: Neck supple. Skin:     General: Skin is warm and dry. Neurological:      General: No focal deficit present. Mental Status: She is alert. Psychiatric:         Mood and Affect: Mood normal.         Behavior: Behavior normal.       Medical problems and test results were reviewed with the patient today.      DATA REVIEW        BMP  Lab Results   Component Value Date/Time     12/24/2021 03:43 PM    K 3.9 12/24/2021 03:43 PM     12/24/2021 03:43 PM    CO2 28 12/24/2021 03:43 PM    BUN 10 12/24/2021 03:43 PM    CREATININE 0.93 12/24/2021 03:43 PM    GLUCOSE 93 12/24/2021 03:43 PM    CALCIUM 9.1 12/24/2021 03:43 PM      7/11/22 0817     Cholesterol 100 - 199 mg/dL 147    Triglycerides 0 - 149 mg/dL 40    HDL Cholesterol >39 mg/dL 52    VLDL Cholesterol 5 - 40 mg/dL 9    LDL, Calculated 0 - 99 mg/dL 86    Chol/HDL Ratio 0 - 4.4 ratio 2.8      6/21/22 1003     TSH 0.450 - 4.500 uIU/mL 2.910      6/21/22 0955     WBC 3.4 - 10.8 x10E3/uL 3.5    RBC 3.77 - 5.28 x10E6/uL 4.27    Hemoglobin 11.1 - 15.9 g/dL 12.5    Hematocrit 34.0 - 46.6 % 38.2    MCV 79 - 97 fL 90    MCH 26.6 - 33.0 pg 29.3    MCHC 31.5 - 35.7 g/dL 32.7    RDW 11.7 - 15.4 % 13.1    Platelets 376 - 039 S07S0/       6/21/22 0955    Vitamin B12 232 - 1245 pg/mL >2,000 High       6/21/22 0955    Glucose 65 - 99 mg/dL 85    BUN 6 - 24 mg/dL 14    Creatinine 0.57 - 1.00 mg/dL 1.15 High     eGFR >59 mL/min/1.73 57 Low BUN/Creat Ratio 9 - 23 12    Sodium 134 - 144 mmol/L 138    Potassium 3.5 - 5.2 mmol/L 4.9    Chloride 96 - 106 mmol/L 101    CO2 20 - 29 mmol/L 24    Calcium 8.7 - 10.2 mg/dL 10.5 High     Protein, Total 6.0 - 8.5 g/dL 7.6    Albumin 3.8 - 4.9 g/dL 5.3 High     Globulin, Total 1.5 - 4.5 g/dL 2.3    A/G Ratio 1.2 - 2.2 2.3 High     Bilirubin, Total 0.0 - 1.2 mg/dL 0.4    Alkaline Phosphatase 44 - 121 IU/L 102    AST 0 - 40 IU/L 23    ALT 0 - 32 IU/L 20        EKG        CXR/IMAGING        DEVICE INTERROGATION        OUTSIDE RECORDS REVIEW    Records from outside providers have been reviewed and summarized as noted in the HPI, past history and data review sections of this note, and reviewed with patient. .       ASSESSMENT and PLAN    Maritza Corona was seen today for cardiomyopathy. Diagnoses and all orders for this visit:    NICM (nonischemic cardiomyopathy) ([facility] [address]  -     Basic Metabolic Panel; Future    Chronic systolic heart failure (HCC)    Palpitations    Iatrogenic hypotension    AICD (automatic cardioverter/defibrillator) present    Stage 3a chronic kidney disease ([facility] [address]    Other orders  -     dapagliflozin (FARXIGA) 5 MG tablet; Take 1 tablet by mouth every morning        IMPRESSION:    NYHA II, tolerating very low dose BB, Entresto, spironolactone. Willing to begin SGLT2i therapy after recurrent discussion today, including rare risk of peroneal infection, kidney injury    Reiterated importance of follow up lab work on medication    Currently stable stage 3 CKD, may need to reduce MRA dose pending follow up results    BP controlled without symptomatic hypotension on current regimen. Palpitations controlled/resolved. Normal ICD function        Return in about 6 weeks (around 11/8/2022). Thank you for allowing me to participate in this patient's care. Please call or contact me if there are any questions or concerns regarding the above.       Catia Frank MD  09/27/22  8:53 AM

## 2022-10-04 ENCOUNTER — CLINICAL DOCUMENTATION (OUTPATIENT)
Dept: CARDIOLOGY CLINIC | Age: 52
End: 2022-10-04

## 2022-10-13 DIAGNOSIS — Z95.810 AICD (AUTOMATIC CARDIOVERTER/DEFIBRILLATOR) PRESENT: Primary | ICD-10-CM

## 2022-10-13 DIAGNOSIS — I42.8 NICM (NONISCHEMIC CARDIOMYOPATHY) (HCC): ICD-10-CM

## 2022-10-13 DIAGNOSIS — Z95.810 AICD (AUTOMATIC CARDIOVERTER/DEFIBRILLATOR) PRESENT: ICD-10-CM

## 2022-10-20 DIAGNOSIS — I42.8 NICM (NONISCHEMIC CARDIOMYOPATHY) (HCC): ICD-10-CM

## 2022-10-20 DIAGNOSIS — Z95.810 AICD (AUTOMATIC CARDIOVERTER/DEFIBRILLATOR) PRESENT: ICD-10-CM

## 2022-11-10 PROBLEM — N18.31 STAGE 3A CHRONIC KIDNEY DISEASE (HCC): Status: RESOLVED | Noted: 2022-09-27 | Resolved: 2022-11-10

## 2022-12-15 RX ORDER — SACUBITRIL AND VALSARTAN 49; 51 MG/1; MG/1
TABLET, FILM COATED ORAL
Qty: 180 TABLET | Refills: 3 | Status: SHIPPED | OUTPATIENT
Start: 2022-12-15

## 2022-12-20 ENCOUNTER — TELEPHONE (OUTPATIENT)
Dept: CARDIOLOGY CLINIC | Age: 52
End: 2022-12-20

## 2022-12-20 NOTE — TELEPHONE ENCOUNTER
Long term use of celebrex and similar meds not ideal with heart failure. Short term use for an injury (7-14 days), or occasional use of prn low dose is low risk. Long term recommend tylenol products, topical products like bio freeze. Topical voltaren gel if a small area involved.

## 2023-01-09 NOTE — PROGRESS NOTES
[facility] CARDIOLOGY  [address], [address]  PHONE: [phone]      01/10/23    NAME:  Darylene Aquas  : 1970  MRN: [id number]         SUBJECTIVE:   Darylene Aquas is a 47 y.o. female seen for a follow up visit regarding the following:     Chief Complaint   Patient presents with    Follow-up     4mth               HPI:  Follow up  Follow-up (4mth )   . Follow up NICM with HFrEF,   Poorly tolerant of meds with hypotension but thus far has tolerated spironolactone, BB, and added SGLT2i therapy last visit , but she didn't tolerate this, just a vague feeling of unwellness. Resolved off the medication. 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 Device check Sep normal without events            Past cardiac history:   Poorly tolerant of beta blockade   2015 -  normal perfusion after 9 minutes, stage III, but was read as an EF of 43%, done for fatigue and dyspnea   May 2015 - Echo - EF confirmed 40-45%, no valve disease, RVSP 22, diastolic function could not be graded. 2015 - Cath - normal coronary arteries, EF low normal 50%, LVEDP 12   Oct 2016 - EF 45%, no valve disease, normal 24 hour monitor, no symptom correlation   2017 - no change   2018 - 43%   2020 - EF 32%   Aug 2020- EF 35%   Oct 2021- primary prevention ICD - Dr Kaylynn Le [facility])  2022- EF 35-40%                Key CAD CHF Meds            sacubitril-valsartan (ENTRESTO) 49-51 MG per tablet (Taking)    Sig - Route: Take 1 tablet by mouth daily - Oral    metoprolol succinate (TOPROL XL) 25 MG extended release tablet (Taking)    Sig - Route: Take 0.5 tablets by mouth daily - Oral    spironolactone (ALDACTONE) 25 MG tablet (Taking)    Sig - Route:  Take 1 tablet by mouth daily TAKE 1 TABLET BY MOUTH EVERY DAY - Oral          Key Antihyperglycemic Medications       Patient is on no antihyperglycemic meds. Past Medical History, Past Surgical History, Family history, Social History, and Medications were all reviewed with the patient today and updated as necessary. Prior to Admission medications    Medication Sig Start Date End Date Taking?  Authorizing Provider   sacubitril-valsartan (ENTRESTO) 49-51 MG per tablet Take 1 tablet by mouth daily 1/10/23  Yes Nida Ceja MD   metoprolol succinate (TOPROL XL) 25 MG extended release tablet Take 0.5 tablets by mouth daily 1/10/23  Yes Nida Ceja MD   spironolactone (ALDACTONE) 25 MG tablet Take 1 tablet by mouth daily TAKE 1 TABLET BY MOUTH EVERY DAY 1/10/23  Yes Nida Ceja MD   methocarbamol (ROBAXIN) 750 MG tablet Take 750 mg by mouth 3 times daily prn   Yes Historical Provider, MD   COD LIVER OIL PO Take by mouth   Yes Ar Automatic Reconciliation   celecoxib (CELEBREX) 100 MG capsule daily 9/4/21  Yes Ar Automatic Reconciliation   cetirizine (ZYRTEC) 10 MG tablet Take 10 mg by mouth as needed   Yes Ar Automatic Reconciliation   Cholecalciferol 50 MCG (2000 UT) CAPS Take by mouth 2 times daily   Yes Ar Automatic Reconciliation   diclofenac sodium (VOLTAREN) 1 % GEL Apply 1-2 g topically 3 times daily 6/13/21  Yes Ar Automatic Reconciliation   dicyclomine (BENTYL) 10 MG capsule Take 10 mg by mouth 4 times daily as needed   Yes Ar Automatic Reconciliation   FLUoxetine (PROZAC) 40 MG capsule Take by mouth daily   Yes Ar Automatic Reconciliation   fluticasone (FLONASE) 50 MCG/ACT nasal spray 1 spray by Nasal route 2 times daily 1/19/21  Yes Ar Automatic Reconciliation   guaiFENesin 400 MG tablet Take 400 mg by mouth as needed   Yes Ar Automatic Reconciliation   itraconazole (SPORANOX) 100 MG capsule Take 200 mg by mouth as needed   Yes Ar Automatic Reconciliation   linaclotide (LINZESS) 290 MCG CAPS capsule Take 290 mcg by mouth daily 4/8/19  Yes Ar Automatic Reconciliation   melatonin 10 MG CAPS capsule Take 10 mg by mouth   Yes Ar Automatic Reconciliation   omeprazole (PRILOSEC) 40 MG delayed release capsule Take 40 mg by mouth daily   Yes Ar Automatic Reconciliation   polyethyl glycol-propyl glycol 0.4-0.3 % (SYSTANE) 0.4-0.3 % ophthalmic solution 1 drop as needed   Yes Ar Automatic Reconciliation   sodium chloride (OCEAN) 0.65 % nasal spray 2 drops   Yes Ar Automatic Reconciliation   Simethicone 125 MG CAPS Take 125 mg by mouth 4 times daily as needed   Yes Ar Automatic Reconciliation   triamcinolone (NASACORT) 55 MCG/ACT nasal inhaler 2 sprays daily   Yes Ar Automatic Reconciliation     Allergies   Allergen Reactions    Farxiga [Dapagliflozin]      Overall didn't feel well    Penicillins Swelling    Sulfa Antibiotics Other (See Comments)    Hydrocodone-Acetaminophen Nausea And Vomiting     Past Medical History:   Diagnosis Date    Arthritis     knees    Chronic pain     knees    GERD (gastroesophageal reflux disease)     controlled with daily omeprazole    Hypertension     controlled with med    Ill-defined condition     Malaise and fatigue     Stage 3a chronic kidney disease ([facility] [address] 9/27/2022     Past Surgical History:   Procedure Laterality Date    CHOLECYSTECTOMY, LAPAROSCOPIC  1990's    HYSTERECTOMY (CERVIX STATUS UNKNOWN)  late 1990's    ORTHOPEDIC SURGERY  1990's    left knee arthroscopy    TONSILLECTOMY  late 1990's    TUBAL LIGATION  1990's     History reviewed. No pertinent family history. Social History     Tobacco Use    Smoking status: Never    Smokeless tobacco: Never   Substance Use Topics    Alcohol use: Yes       ROS:    Review of Systems   Constitutional: Positive for malaise/fatigue. Cardiovascular:  Negative for chest pain and leg swelling.         PHYSICAL EXAM:   /80   Pulse 64   Ht 5' 5\" (1.651 m)   Wt 160 lb 12.8 oz (72.9 kg)   BMI 26.76 kg/m²      Wt Readings from Last 3 Encounters:   01/10/23 160 lb 12.8 oz (72.9 kg)   09/27/22 162 lb (73.5 kg)   07/14/22 157 lb (71.2 kg)     BP Readings from Last 3 Encounters:   01/10/23 110/80   09/27/22 116/82   07/14/22 110/70     Pulse Readings from Last 3 Encounters:   01/10/23 64   09/27/22 60   07/14/22 66           Physical Exam  Constitutional:       Appearance: Normal appearance. HENT:      Head: Normocephalic and atraumatic. Eyes:      Conjunctiva/sclera: Conjunctivae normal.   Neck:      Vascular: No carotid bruit. Cardiovascular:      Rate and Rhythm: Normal rate and regular rhythm. Heart sounds: No murmur heard. No friction rub. No gallop. Pulmonary:      Effort: No respiratory distress. Breath sounds: No wheezing or rales. Musculoskeletal:         General: No swelling. Cervical back: Neck supple. Skin:     General: Skin is warm and dry. Neurological:      General: No focal deficit present. Mental Status: She is alert. Psychiatric:         Mood and Affect: Mood normal.         Behavior: Behavior normal.       Medical problems and test results were reviewed with the patient today.      DATA REVIEW     12/20/22 0920    Cholesterol 100 - 199 mg/dL 168    Triglycerides 0 - 149 mg/dL 84    HDL Cholesterol >39 mg/dL 45    VLDL Cholesterol 5 - 40 mg/dL 16    LDL, Calculated 0 - 99 mg/dL 107 High     Chol/HDL Ratio 0.0 - 4.4 ratio 3.7       10/1/22 1241    Sodium 136 - 145 mmol/L 142    Potassium 3.5 - 5.1 mmol/L 4.0    Chloride 98 - 107 mmol/L 107    CO2 20 - 30 mmol/L 24    Anion Gap 6 - 16 mmol/L 11    BUN 7 - 20 mg/dL 12    Creatinine 0.57 - 1.11 mg/dL 0.83    Glucose 70 - 99 mg/dL 89    Calcium 8.5 - 10.4 mg/dL 9.6    AST 5 - 34 IU/L 19    ALT <55 IU/L 21    Alkaline Phosphatase 40 - 150 IU/L 82    Protein, Total 6.2 - 8.3 g/dL 7.2    Albumin 3.5 - 5.0 g/dL 4.1    Bilirubin, Total 0.1 - 1.2 mg/dL 0.4    eGFR >59 mL/min/1.73 m2 85       10/1/22 1241    WBC 3.5 - 10.8 K/uL 3.6    RBC 3.86 - 5.35 M/uL 4.01    Hemoglobin 11.0 - 15.4 g/dL 11.3    Hematocrit 35.6 - 47.3 % 34.9 Low     MCV 79.0 - 100.0 fL 87.0    MCH 23.7 - 32.9 pg 28.2    MCHC 30.0 - 36.5 g/dL 32.5    RDW-CV 11.6 - 16.0 % 13.1    Platelets 055 - 287 K/uL 287      BMP  Lab Results   Component Value Date/Time     12/24/2021 03:43 PM    K 3.9 12/24/2021 03:43 PM     12/24/2021 03:43 PM    CO2 28 12/24/2021 03:43 PM    BUN 10 12/24/2021 03:43 PM    CREATININE 0.93 12/24/2021 03:43 PM    GLUCOSE 93 12/24/2021 03:43 PM    CALCIUM 9.1 12/24/2021 03:43 PM      Glucose 89 -- 85 82 88 83 Load older lab results   BUN 12 -- 14 14 12 15 Load older lab results   Creatinine 0.83 -- 1.15 High     0.99 0.93 1.01 High     Load older lab results   Sodium 142 -- 138 140 140 142 Load older lab results   Potassium 4.0 -- 4.9 4 4 4.1 Load older lab results   Chloride 107 -- 101 100 104 100 Load older lab results   Calcium 9.6 -- 10.5 High     10.1 9.7 10.2 Load older lab results   Protein, Total 7.2 -- 7.6 7.3 6.8 7.4 Load older lab results   Albumin 4.1 -- 5.3 High     4.8 4.6 5.2 High     Load older lab results   Globulin, Total -- -- 2.3 2.5 2.2 2.2 Load older lab results   Bilirubin, Total 0.4 -- 0.4 0.2 <0.2 <0.2 Load older lab results   Alkaline Phosphatase 82 -- 102 95 93 76 Load older lab results   AST 19 -- 23 23 21 18 Load older lab results   ALT 21 -- 20 15 17 14 Load older lab results   BUN/Creat Ratio -- -- 12 14 13 15 Load older lab results   CO2 24 -- 24 25 21 24 Load older lab results   A/G Ratio -- -- 2.3 High     1.9 2.1 2.4 High     Load older lab results   Anion Gap 11 -- -- -- -- -- Load older lab results   eGFR 85             EKG        CXR/IMAGING        DEVICE INTERROGATION        OUTSIDE RECORDS REVIEW    Records from outside providers have been reviewed and summarized as noted in the HPI, past history and data review sections of this note, and reviewed with patient. .       ASSESSMENT and PLAN    Francisco Beavers was seen today for follow-up.     Diagnoses and all orders for this visit:    NICM (nonischemic cardiomyopathy) ([facility] [address]  - Transthoracic echocardiogram (TTE) complete with contrast, bubble, strain, and 3D PRN; Future  -     Basic Metabolic Panel; Future    AICD (automatic cardioverter/defibrillator) present    Iatrogenic hypotension  -     Transthoracic echocardiogram (TTE) complete with contrast, bubble, strain, and 3D PRN; Future  -     Basic Metabolic Panel; Future    Other orders  -     sacubitril-valsartan (ENTRESTO) 49-51 MG per tablet; Take 1 tablet by mouth daily  -     metoprolol succinate (TOPROL XL) 25 MG extended release tablet; Take 0.5 tablets by mouth daily  -     spironolactone (ALDACTONE) 25 MG tablet; Take 1 tablet by mouth daily TAKE 1 TABLET BY MOUTH EVERY DAY        IMPRESSION:    NYHA II, continue meds, echo before next visit. SGLT2i intolerant    BP stable, no further drops, continue current therapy    Explained need for serial bmp on MRA and ARNI    Normal device function, no events        Return in about 6 months (around 7/10/2023) for ECHO BEFORE VISIT, LABS BEFORE VISIT. Thank you for allowing me to participate in this patient's care. Please call or contact me if there are any questions or concerns regarding the above.       Emily Mata MD  01/10/23  8:24 AM

## 2023-01-10 ENCOUNTER — OFFICE VISIT (OUTPATIENT)
Dept: CARDIOLOGY CLINIC | Age: 53
End: 2023-01-10
Payer: COMMERCIAL

## 2023-01-10 VITALS
SYSTOLIC BLOOD PRESSURE: 110 MMHG | WEIGHT: 160.8 LBS | HEIGHT: 65 IN | DIASTOLIC BLOOD PRESSURE: 80 MMHG | BODY MASS INDEX: 26.79 KG/M2 | HEART RATE: 64 BPM

## 2023-01-10 DIAGNOSIS — I42.8 NICM (NONISCHEMIC CARDIOMYOPATHY) (HCC): Primary | ICD-10-CM

## 2023-01-10 DIAGNOSIS — I42.8 NICM (NONISCHEMIC CARDIOMYOPATHY) (HCC): ICD-10-CM

## 2023-01-10 DIAGNOSIS — I95.89 IATROGENIC HYPOTENSION: ICD-10-CM

## 2023-01-10 DIAGNOSIS — Z95.810 AICD (AUTOMATIC CARDIOVERTER/DEFIBRILLATOR) PRESENT: ICD-10-CM

## 2023-01-10 PROCEDURE — 99214 OFFICE O/P EST MOD 30 MIN: CPT | Performed by: INTERNAL MEDICINE

## 2023-01-10 RX ORDER — SACUBITRIL AND VALSARTAN 49; 51 MG/1; MG/1
1 TABLET, FILM COATED ORAL DAILY
Qty: 180 TABLET | Refills: 3 | Status: SHIPPED | OUTPATIENT
Start: 2023-01-10

## 2023-01-10 RX ORDER — METHOCARBAMOL 750 MG/1
750 TABLET, FILM COATED ORAL 3 TIMES DAILY
COMMUNITY

## 2023-01-10 RX ORDER — METOPROLOL SUCCINATE 25 MG/1
12.5 TABLET, EXTENDED RELEASE ORAL DAILY
Qty: 45 TABLET | Refills: 3 | Status: SHIPPED | OUTPATIENT
Start: 2023-01-10

## 2023-01-10 RX ORDER — SPIRONOLACTONE 25 MG/1
25 TABLET ORAL DAILY
Qty: 90 TABLET | Refills: 3 | Status: SHIPPED | OUTPATIENT
Start: 2023-01-10

## 2023-01-10 NOTE — PATIENT INSTRUCTIONS
Patient Education        Heart Failure: Care Instructions  Overview     Heart failure occurs when your heart does not pump as much blood as the body needs. Failure does not mean that the heart has stopped pumping but rather that it is not pumping as well as it should. Over time, this causes fluid buildup in your lungs and other parts of your body. Fluid buildup can cause shortness of breath, fatigue, swollen ankles, and other problems. Heart failure is treated with medicines, a heart-healthy lifestyle, and the steps you take to check your symptoms. Treatment can slow the disease, help you feel better, and help keep you out of the hospital. Treatment may also help you live longer. Follow-up care is a key part of your treatment and safety. Be sure to make and go to all appointments, and call your doctor if you are having problems. It's also a good idea to know your test results and keep a list of the medicines you take. How can you care for yourself at home? Medicines    Be safe with medicines. Take your medicines exactly as prescribed. Call your doctor if you think you are having a problem with your medicine. You will get more details on the specific medicines your doctor prescribes. Medicines can help your heart work better, help you feel better, and help keep you out of the hospital. Medicines may also help you live longer. Talk with your doctor or pharmacist before you take a new prescription or over-the-counter medicine. Ask if the medicine is safe for you to take. Some medicines can affect your heart and make heart failure worse. Others may keep your heart failure medicines from working right. Over-the-counter medicines that you may need to avoid include herbal supplements, vitamins, pain relievers called NSAIDs, antacids, laxatives, and cough, cold, flu, or sinus medicine. Diet    Eat heart-healthy foods. These foods include vegetables, fruits, nuts, beans, lean meat, fish, and whole grains. Your doctor may suggest that you limit sodium. Your doctor can tell you how much sodium is right for you. An example is less than 3,000 mg a day. This includes all the salt you eat in cooking or in packaged foods. People get most of their sodium from processed foods. Fast food and restaurant meals also tend to be very high in sodium. Limit your fluid intake if your doctor tells you to. Your doctor will tell you how much fluid you can have in a day. Symptoms    Weigh yourself without clothing at the same time each day. Record your weight. Call your doctor if you have a sudden weight gain, such as more than 2 to 3 pounds in a day or 5 pounds in a week. (Your doctor may suggest a different range of weight gain.) A sudden weight gain may mean that your heart failure is getting worse. Check your symptoms every day to watch for changes. Know what to do if your symptoms get worse. Activity    Be active. Do not start to exercise until you have talked with your doctor. Together you can make an exercise program that is enjoyable and safe for you. Regular exercise can make your heart and your body stronger. Being active can help you feel better too. With your doctor, plan how often, how long, and how hard you will be active. Don't exercise too hard because it can put stress on your heart. If your doctor has not set you up with a cardiac rehabilitation (rehab) program, ask if it's right for you. Cardiac rehab can give you education and support that help you stay as healthy as possible. When you exercise, watch for signs that your heart is working too hard. You are pushing yourself too hard if you cannot talk while you are exercising. If you become short of breath or dizzy or have chest pain, stop, sit down, and rest.   Heart-healthy lifestyle    Do not smoke. Smoking can make a heart condition worse. If you need help quitting, talk to your doctor about stop-smoking programs and medicines.  These can increase your chances of quitting for good. Quitting smoking may be the most important step you can take to protect your heart. Stay at a healthy weight. Lose weight if you need to. Manage other health problems such as diabetes and high blood pressure. Limit or avoid alcohol. Ask your doctor how much alcohol, if any, is safe for you. If you think you may have a problem with alcohol or drug use, talk to your doctor. Avoid infections such as COVID-19, colds, and the flu. Get the flu vaccine every year. Get a pneumococcal vaccine shot. If you have had one before, ask your doctor whether you need another dose. Stay up to date on your COVID-19 vaccines. When should you call for help? Call 911  if you have symptoms of sudden heart failure such as: You have severe trouble breathing. You cough up pink, foamy mucus. You have a new irregular or rapid heartbeat. Call your doctor now or seek immediate medical care if:    You have new or increased shortness of breath. You are dizzy or lightheaded, or you feel like you may faint. You have sudden weight gain, such as more than 2 to 3 pounds in a day or 5 pounds in a week. (Your doctor may suggest a different range of weight gain.)     You have increased swelling in your legs, ankles, or feet. You are suddenly so tired or weak that you cannot do your usual activities. Watch closely for changes in your health, and be sure to contact your doctor if you develop new symptoms. Where can you learn more? Go to http://www.woods.com/ and enter E597 to learn more about \"Heart Failure: Care Instructions. \"  Current as of: September 7, 2022               Content Version: 13.5  © 3440-2510 [employer]. Care instructions adapted under license by [facility] ([facility]).  If you have questions about a medical condition or this instruction, always ask your healthcare professional. Edwin Marques disclaims any warranty or liability for your use of this information.

## 2023-02-28 ENCOUNTER — TELEPHONE (OUTPATIENT)
Dept: CARDIOLOGY CLINIC | Age: 53
End: 2023-02-28

## 2023-02-28 NOTE — TELEPHONE ENCOUNTER
Patient called stating she is experiencing occasional CP as well as left arm pain. Pt denies SOB or pressure in chest. Pt states this has been occurring over the last 2 weeks.

## 2023-02-28 NOTE — TELEPHONE ENCOUNTER
Pt c/o intermittent chest pain for the past couple weeks, worse when she bends over. States pain worse when she moves a certain way. Also c/o L. Arm cramping beginning in her elbow and going down into her hand. Also c/o foot cramping. Recommend pt call PCP for f/u appt as sx do not sound cardiac based on chest pain worsening with movement.

## 2023-03-07 ENCOUNTER — TELEPHONE (OUTPATIENT)
Dept: CARDIOLOGY CLINIC | Age: 53
End: 2023-03-07

## 2023-03-07 NOTE — TELEPHONE ENCOUNTER
Salome from [place] requesting clarification on Entresto instructions. Becky Damian states that the instructions are different than previously prescribed. Asking for a new RX.

## 2023-03-07 NOTE — TELEPHONE ENCOUNTER
I spoke with the patient. Notified her on 01/10/223, her Ben Keith was sent in for 180 tablets with three refills. It appears the most recent rx sent in, directions did not match the quantity. [facility]   Dispensed Days Supply Quantity Provider Pharmacy   ENTRESTO 49 MG-51 MG TABLET 02/27/2023 30 30 tablet MD Aaron Fisher 52 #. .. ENTRESTO 49 MG-51 MG TABLET 02/10/2023 30 30 tablet MD Aaron Fisher 52 #. .. ENTRESTO 49 MG-51 MG TABLET 01/10/2023 30 30 tablet MD Aaron Fisher 52 #. .. ENTRESTO 49 MG-51 MG TABLET 12/21/2022 25 50 tablet MD Aaron Fisher 52 #. .. ENTRESTO 49 MG-51 MG TABLET 12/15/2022 5 10 tablet MD Aaron Fisher 52 #. .. ENTRESTO 49 MG-51 MG TABLET 11/15/2022 30 60 tablet MD Aaron Fisher 52 #. .. ENTRESTO 49 MG-51 MG TABLET 10/20/2022 30 60 tablet MD Aaron Fisher 52 #. .. ENTRESTO 49 MG-51 MG TABLET 09/16/2022 30 60 tablet MD Aaron Fisher 52 #. .. ENTRESTO 49 MG-51 MG TABLET 08/17/2022 30 60 tablet MD Aaron Fisher 52 #. .. ENTRESTO 49 MG-51 MG TABLET 07/18/2022 30 60 tablet LIBRA FUNES 52 #. .. ENTRESTO 49 MG-51 MG TABLET 06/18/2022 30 60 tablet LIBRA FUNES 52 #. .. ENTRESTO 49 MG-51 MG TABLET 05/15/2022 30 60 tablet [facility]ACE[facility] MORALEZ 52 #. .. ENTRESTO 49 MG-51 MG TABLET 04/18/2022 30 60 tablet LIBRA FUNES 52 #. .. ENTRESTO 49 MG-51 MG TABLET 03/16/2022 30 60 tablet LIBRA FUNES  #. .. ENTRESTO 49 MG-51 MG TABLET 02/21/2022 30 60 tablet [facility] #. .. ENTRESTO 49 MG-51 MG TABLET [facility]/13/2022 30 [facility] ..    ENTRESTO 49 MG-51 MG TABLET 12/21/2021 30 60 tablet [facility] 52 #. .. ENTRESTO 49 MG-51 MG TABLET 11/14/2021 30 60 tablet [facility] #. .. ENTRESTO 49 MG-51 MG TABLET 10/15/2021 30 60 tablet [facility] #. .. ENTRESTO 49 MG-51 MG TABLET 09/21/2021 30 60 tablet [facility] #. .. ENTRESTO 49 MG-51 MG TABLET 08/12/2021 30 60 tablet [facility] 52 #. .. ENTRESTO 49 MG-51 MG TABLET 07/10/2021 30 60 tablet [facility] #. ..

## 2023-03-07 NOTE — TELEPHONE ENCOUNTER
Pt calling about RX Entresto, states that usually she gets a 60 day supply but this time she only got a 30 day supply and pt take medication 1 tablet 2 times a day. Pt would like the 60 day supply again.  Pt would appreciate a call

## 2023-03-10 DIAGNOSIS — I42.8 NICM (NONISCHEMIC CARDIOMYOPATHY) (HCC): ICD-10-CM

## 2023-03-10 DIAGNOSIS — Z95.810 AICD (AUTOMATIC CARDIOVERTER/DEFIBRILLATOR) PRESENT: ICD-10-CM

## 2023-04-04 NOTE — PROGRESS NOTES
Automatic Reconciliation   guaiFENesin 400 MG tablet Take 1 tablet by mouth as needed   Yes Ar Automatic Reconciliation   itraconazole (SPORANOX) 100 MG capsule Take 2 capsules by mouth as needed   Yes Ar Automatic Reconciliation   linaclotide (LINZESS) 290 MCG CAPS capsule Take 1 capsule by mouth daily 4/8/19  Yes Ar Automatic Reconciliation   melatonin 10 MG CAPS capsule Take 1 capsule by mouth   Yes Ar Automatic Reconciliation   omeprazole (PRILOSEC) 40 MG delayed release capsule Take 1 capsule by mouth daily   Yes Ar Automatic Reconciliation   polyethyl glycol-propyl glycol 0.4-0.3 % (SYSTANE) 0.4-0.3 % ophthalmic solution 1 drop as needed   Yes Ar Automatic Reconciliation   sodium chloride (OCEAN) 0.65 % nasal spray 2 sprays   Yes Ar Automatic Reconciliation   Simethicone 125 MG CAPS Take 1 capsule by mouth 4 times daily as needed   Yes Ar Automatic Reconciliation   triamcinolone (NASACORT) 55 MCG/ACT nasal inhaler 2 sprays daily   Yes Ar Automatic Reconciliation     Allergies   Allergen Reactions    Farxiga [Dapagliflozin]      Overall didn't feel well    Penicillins Swelling    Sulfa Antibiotics Other (See Comments)    Hydrocodone-Acetaminophen Nausea And Vomiting     Past Medical History:   Diagnosis Date    Arthritis     knees    Chronic pain     knees    GERD (gastroesophageal reflux disease)     controlled with daily omeprazole    Hypertension     controlled with med    Ill-defined condition     Malaise and fatigue     Stage 3a chronic kidney disease ([facility] [address] 9/27/2022     Past Surgical History:   Procedure Laterality Date    CHOLECYSTECTOMY, LAPAROSCOPIC  1990's    HYSTERECTOMY (CERVIX STATUS UNKNOWN)  late 1990's    ORTHOPEDIC SURGERY  1990's    left knee arthroscopy    TONSILLECTOMY  late 1990's    TUBAL LIGATION  1990's     History reviewed. No pertinent family history. Social History     Tobacco Use    Smoking status: Never    Smokeless tobacco: Never   Substance Use Topics    Alcohol use:  Yes

## 2023-04-05 ENCOUNTER — OFFICE VISIT (OUTPATIENT)
Dept: CARDIOLOGY CLINIC | Age: 53
End: 2023-04-05
Payer: COMMERCIAL

## 2023-04-05 VITALS
HEART RATE: 66 BPM | DIASTOLIC BLOOD PRESSURE: 72 MMHG | WEIGHT: 151.2 LBS | HEIGHT: 65 IN | BODY MASS INDEX: 25.19 KG/M2 | SYSTOLIC BLOOD PRESSURE: 118 MMHG

## 2023-04-05 DIAGNOSIS — I95.89 IATROGENIC HYPOTENSION: ICD-10-CM

## 2023-04-05 DIAGNOSIS — I50.22 CHRONIC HFREF (HEART FAILURE WITH REDUCED EJECTION FRACTION) (HCC): ICD-10-CM

## 2023-04-05 DIAGNOSIS — Z95.810 AICD (AUTOMATIC CARDIOVERTER/DEFIBRILLATOR) PRESENT: ICD-10-CM

## 2023-04-05 DIAGNOSIS — I42.8 NICM (NONISCHEMIC CARDIOMYOPATHY) (HCC): Primary | ICD-10-CM

## 2023-04-05 PROCEDURE — 93000 ELECTROCARDIOGRAM COMPLETE: CPT | Performed by: INTERNAL MEDICINE

## 2023-04-05 PROCEDURE — 99214 OFFICE O/P EST MOD 30 MIN: CPT | Performed by: INTERNAL MEDICINE

## 2023-04-05 RX ORDER — IBUPROFEN 800 MG/1
800 TABLET ORAL
Qty: 21 TABLET | Refills: 0 | Status: SHIPPED | OUTPATIENT
Start: 2023-04-05 | End: 2023-04-12

## 2023-05-09 ENCOUNTER — TELEPHONE (OUTPATIENT)
Age: 53
End: 2023-05-09

## 2023-05-09 NOTE — TELEPHONE ENCOUNTER
Pt states that she had to go to the hospital for chest problems and they schedule her an appt for ed stress test this Thursday 5/11/23. Pt wants to know if she can get one here instead.

## 2023-05-09 NOTE — TELEPHONE ENCOUNTER
Patient advised to keep the appointment she has, due to our [address] [place] [address] Road scheduling being back-up. The department is having software issues with the camera.

## 2023-07-10 ENCOUNTER — OFFICE VISIT (OUTPATIENT)
Age: 53
End: 2023-07-10
Payer: COMMERCIAL

## 2023-07-10 VITALS
DIASTOLIC BLOOD PRESSURE: 62 MMHG | SYSTOLIC BLOOD PRESSURE: 104 MMHG | HEART RATE: 72 BPM | HEIGHT: 65 IN | BODY MASS INDEX: 25.99 KG/M2 | WEIGHT: 156 LBS

## 2023-07-10 DIAGNOSIS — Z01.818 PRE-OP EVALUATION: ICD-10-CM

## 2023-07-10 DIAGNOSIS — I95.89 IATROGENIC HYPOTENSION: ICD-10-CM

## 2023-07-10 DIAGNOSIS — Z95.810 AICD (AUTOMATIC CARDIOVERTER/DEFIBRILLATOR) PRESENT: ICD-10-CM

## 2023-07-10 DIAGNOSIS — I42.8 NICM (NONISCHEMIC CARDIOMYOPATHY) (HCC): Primary | ICD-10-CM

## 2023-07-10 PROCEDURE — 99214 OFFICE O/P EST MOD 30 MIN: CPT | Performed by: INTERNAL MEDICINE

## 2023-07-10 RX ORDER — TERBINAFINE HYDROCHLORIDE 250 MG/1
TABLET ORAL
COMMUNITY
Start: 2023-07-05

## 2023-07-10 RX ORDER — CLOTRIMAZOLE AND BETAMETHASONE DIPROPIONATE 10; .64 MG/G; MG/G
CREAM TOPICAL
COMMUNITY
Start: 2023-07-05

## 2023-07-10 ASSESSMENT — ENCOUNTER SYMPTOMS: BACK PAIN: 1

## 2023-08-16 ENCOUNTER — NURSE ONLY (OUTPATIENT)
Age: 53
End: 2023-08-16

## 2023-08-16 DIAGNOSIS — I50.22 CHRONIC HFREF (HEART FAILURE WITH REDUCED EJECTION FRACTION) (HCC): Primary | ICD-10-CM

## 2023-09-11 PROCEDURE — 93296 REM INTERROG EVL PM/IDS: CPT | Performed by: INTERNAL MEDICINE

## 2023-09-11 PROCEDURE — 93295 DEV INTERROG REMOTE 1/2/MLT: CPT | Performed by: INTERNAL MEDICINE

## 2023-09-25 ENCOUNTER — TELEPHONE (OUTPATIENT)
Age: 53
End: 2023-09-25

## 2023-09-25 NOTE — TELEPHONE ENCOUNTER
See attached office note with surgical clearance being addressed at last office visit. Faxed to [facility] Fax [phone].

## 2023-10-06 ENCOUNTER — HOSPITAL ENCOUNTER (OUTPATIENT)
Dept: SURGERY | Age: 53
Discharge: HOME | End: 2023-10-06
Payer: COMMERCIAL

## 2023-10-06 VITALS
RESPIRATION RATE: 17 BRPM | OXYGEN SATURATION: 96 % | HEIGHT: 66 IN | TEMPERATURE: 98.1 F | SYSTOLIC BLOOD PRESSURE: 118 MMHG | DIASTOLIC BLOOD PRESSURE: 82 MMHG | WEIGHT: 154.5 LBS | HEART RATE: 58 BPM | BODY MASS INDEX: 24.83 KG/M2

## 2023-10-06 LAB
CREAT SERPL-MCNC: 0.97 MG/DL (ref 0.6–1)
HGB BLD-MCNC: 12 G/DL (ref 11.7–15.4)
POTASSIUM SERPL-SCNC: 3.8 MMOL/L (ref 3.5–5.1)

## 2023-10-06 PROCEDURE — 84132 ASSAY OF SERUM POTASSIUM: CPT

## 2023-10-06 PROCEDURE — 82565 ASSAY OF CREATININE: CPT

## 2023-10-06 PROCEDURE — 85018 HEMOGLOBIN: CPT

## 2023-10-06 RX ORDER — ACETAMINOPHEN 500 MG
500 TABLET ORAL EVERY 6 HOURS PRN
COMMUNITY

## 2023-10-06 RX ORDER — ASCORBIC ACID 500 MG
500 TABLET ORAL DAILY
COMMUNITY

## 2023-10-06 RX ORDER — VITAMIN B COMPLEX
1 CAPSULE ORAL DAILY
COMMUNITY

## 2023-10-06 NOTE — PRE-PROCEDURE INSTRUCTIONS
Patient verified name and     Order for consent was not found in EHR ; patient verified. Type II surgery, walk in assessment complete. Labs per surgeon: none received for PAT at this time. Labs per anesthesia protocol: Hemoglobin, Potassium, Creatinine. [id number], acceptable per protocol. Patient denies any chest pain/pressure or increasing shortness of breath with daily activities. Anesthesia review for ICD. Hospital approved surgical skin cleanser and instructions given per hospital policy. Patient provided with and instructed on educational handouts including Guide to Surgery, Pain Management, Hand Hygiene, Blood Transfusion Education, and [facility] Brochure. Patient answered medical/surgical history questions at their best of ability. All prior to admission medications documented in [facility]. Original medication prescription bottle were visualized during patient appointment. Patient instructed to hold all vitamins 7 days prior to surgery and NSAIDS 5 days prior to surgery, patient verbalized understanding. Patient teach back successful and patient demonstrates knowledge of instructions.

## 2023-10-06 NOTE — PRE-PROCEDURE INSTRUCTIONS
PLEASE CONTINUE TAKING ALL PRESCRIPTION MEDICATIONS UP TO THE DAY OF SURGERY UNLESS OTHERWISE DIRECTED BELOW. DISCONTINUE all vitamins and supplements 7 days prior to surgery. DISCONTINUE Non-Steroidal Anti-Inflammatory (NSAIDS) such as Advil and Aleve 5 days prior to surgery. Home Medications to take  the day of surgery    Sacubitril-valsartan Natalie Whitehead)  Fluoxetine (Prozac)   Omprazole (Prilosec)          Home Medications   to Hold   Spironolactone (Aldactone)        Comments      On the day before surgery please take Acetaminophen 1000mg in the morning and then again before bed. You may substitute for Tylenol 650 mg. Please do not bring home medications with you on the day of surgery unless otherwise directed by your nurse. If you are instructed to bring home medications, please give them to your nurse as they will be administered by the nursing staff. If you have any questions, please call [address] [place] [address] Jm [phone]. A copy of this note was provided to the patient for reference.

## 2023-10-06 NOTE — PRE-PROCEDURE INSTRUCTIONS
During assessment the patient was found to have a pacemaker and a surgery that is either positioned prone or lateral and has an ICD or is ipsilateral, upper extremity, or a chest surgery. This RN will obtain the following and place on chart for review per anesthesia protocol: last cardiology visit, most recent pacer interrogation,  EKG, ECHO. Pacemaker card obtained and copy placed into Electronic Health Record. Patient states no to the question \"Are you pacer dependent? \"    Patient states they have not been shocked in the past month. Call placed to the pacemaker Tech support at [facility] for the following information. What will happen if a magnet is used? Disables tachy function. It will beep once per second while in place. If difficult to have a magnet in place over tissue, recommend a representative present to reprogram it. Anesthesia to review the responses on this note and advise if a representative will be needed on the day of surgery.

## 2023-10-09 NOTE — PROGRESS NOTES
Mauro Galdamez from [employer] returned call and will have rep present DOS. Preop phone number provided to Rep for arrival/surgery time information.

## 2023-10-09 NOTE — PROGRESS NOTES
Dr. iLliya Elizondo reviewed chart. New order received \"ICD rep DOS. \" [facility] to proceed. [address] paged.

## 2023-11-08 NOTE — PERIOP NOTE
Patient had walk in Pre-assessment testing appointment with Viral Quintanilla RN on 10/6/23. Dr. Marita Navas reviewed case and approved surgery to proceed as scheduled 11/13/23. Dr. Marita Navas requested ICD rep for DOS. Vergil Riazns with [employer] was notified by Hien Milian RN. Patient denies any changes in cardiac health.

## 2023-11-10 ENCOUNTER — PREP FOR PROCEDURE (OUTPATIENT)
Dept: SURGERY | Age: 53
End: 2023-11-10

## 2023-11-10 RX ORDER — SODIUM CHLORIDE 9 MG/ML
INJECTION, SOLUTION INTRAVENOUS PRN
Status: CANCELLED | OUTPATIENT
Start: 2023-11-10

## 2023-11-10 RX ORDER — SODIUM CHLORIDE 0.9 % (FLUSH) 0.9 %
5-40 SYRINGE (ML) INJECTION PRN
Status: CANCELLED | OUTPATIENT
Start: 2023-11-10

## 2023-11-10 RX ORDER — SODIUM CHLORIDE 0.9 % (FLUSH) 0.9 %
5-40 SYRINGE (ML) INJECTION EVERY 12 HOURS SCHEDULED
Status: CANCELLED | OUTPATIENT
Start: 2023-11-10

## 2023-11-10 NOTE — PERIOP NOTE
Ginette Caballero from [facility] of patient arrival and surgery schedule start time. States he will be here at 0700 DOS.

## 2023-11-10 NOTE — H&P
Patient Information-  Asia Rushing   : 1970   Scheduled Services for : Elizabeth Rothman Blakemore   Appt Purpose: Breast Reduction - Bilateral Appt Notes: *SFM*    COLLECT $502.19   Referral Source: [employer]   Occupation:   Insurance: Blue Cross Blue Shield Plan: [facility]   Surgery date: 2023 Breast Reduction Dr Porsche BENNETT   Medications / Allergies  :  Non-steroidal counseling  Patient was counseled to avoid all non-steroidals, ASA, Fish Oil and Vitamin E products for 7-10 days before any procedures. Pt verbalized understanding.   List any allergies:      Allergy   1 pencillin   2 broxin   3 suffiex   4 bandage   List any current medications (Please include over-the-counter medications)      Drug Dosage   1. Entesto 49-51mg (1 tab twice daily)   2. Omeprazole 20mg (1 once daily)   3. Spironolactone 25mg (1 daily)   4. Linzess as needed   5. Bgard    6. Fluxetine 40mg (1 daily)   7. Metoprolol ER Succinate 25mg (1/2 tablet daily)   8. Magnesium    9. Tylenol    10. Coricidin    11. Maxium Strength cold, flu, chest    12 Celecoxib  100mg (twice daily)   Non-steroidal anti-inflammatory drugs (ex. Motrin, Aleve)  Patient admits regular use of NSAID's.   Do you take vitamins/minerals?      Yes If yes, what kind?     Vit E, B Complex, Melatonin 10mg, C Vit, D Vit, Flaxseed   Current Medications  Name Sig Start Date Discontinue Date   cefadroxil 500 mg capsule 1 capsule by mouth BID 10/31/2023    cyclobenzaprine 10 mg tablet 1 tablet by mouth Q6-8h 10/31/2023    ondansetron 8 mg disintegrating tablet 1 tablet by mouth as directed Dissolve one tablet under tongue every 4-6 hours as needed for post-operative nausea. 10/31/2023    scopolamine 1.5 mg transdermal patch (1 mg over 3 days) 1 patch on the skin as directed Apply behind ear or under upper arm night before surgery. 10/31/2023    Height / Weight / BMI:  Height/Weight/BMI  Her height is 5ft 6in and weight is 169lbs.

## 2023-11-12 ENCOUNTER — ANESTHESIA EVENT (OUTPATIENT)
Dept: SURGERY | Age: 53
End: 2023-11-12
Payer: COMMERCIAL

## 2023-11-13 ENCOUNTER — HOSPITAL ENCOUNTER (OUTPATIENT)
Age: 53
Setting detail: OUTPATIENT SURGERY
Discharge: HOME | End: 2023-11-13
Attending: PLASTIC SURGERY | Admitting: PLASTIC SURGERY
Payer: COMMERCIAL

## 2023-11-13 ENCOUNTER — ANESTHESIA (OUTPATIENT)
Dept: SURGERY | Age: 53
End: 2023-11-13
Payer: COMMERCIAL

## 2023-11-13 VITALS
OXYGEN SATURATION: 100 % | HEIGHT: 65 IN | BODY MASS INDEX: 25.43 KG/M2 | DIASTOLIC BLOOD PRESSURE: 61 MMHG | TEMPERATURE: 98 F | WEIGHT: 152.6 LBS | SYSTOLIC BLOOD PRESSURE: 99 MMHG | HEART RATE: 60 BPM | RESPIRATION RATE: 15 BRPM

## 2023-11-13 PROCEDURE — 2580000003 HC RX 258: Performed by: ANESTHESIOLOGY

## 2023-11-13 PROCEDURE — 3600000012 HC SURGERY LEVEL 2 ADDTL 15MIN: Performed by: PLASTIC SURGERY

## 2023-11-13 PROCEDURE — 6360000002 HC RX W HCPCS: Performed by: ANESTHESIOLOGY

## 2023-11-13 PROCEDURE — 2500000003 HC RX 250 WO HCPCS: Performed by: NURSE ANESTHETIST, CERTIFIED REGISTERED

## 2023-11-13 PROCEDURE — 7100000001 HC PACU RECOVERY - ADDTL 15 MIN: Performed by: PLASTIC SURGERY

## 2023-11-13 PROCEDURE — 6370000000 HC RX 637 (ALT 250 FOR IP): Performed by: PLASTIC SURGERY

## 2023-11-13 PROCEDURE — 3600000002 HC SURGERY LEVEL 2 BASE: Performed by: PLASTIC SURGERY

## 2023-11-13 PROCEDURE — 6360000002 HC RX W HCPCS: Performed by: PLASTIC SURGERY

## 2023-11-13 PROCEDURE — 6360000002 HC RX W HCPCS: Performed by: NURSE ANESTHETIST, CERTIFIED REGISTERED

## 2023-11-13 PROCEDURE — 2709999900 HC NON-CHARGEABLE SUPPLY: Performed by: PLASTIC SURGERY

## 2023-11-13 PROCEDURE — 7100000011 HC PHASE II RECOVERY - ADDTL 15 MIN: Performed by: PLASTIC SURGERY

## 2023-11-13 PROCEDURE — 2500000003 HC RX 250 WO HCPCS: Performed by: PLASTIC SURGERY

## 2023-11-13 PROCEDURE — 2580000003 HC RX 258: Performed by: NURSE ANESTHETIST, CERTIFIED REGISTERED

## 2023-11-13 PROCEDURE — 7100000000 HC PACU RECOVERY - FIRST 15 MIN: Performed by: PLASTIC SURGERY

## 2023-11-13 PROCEDURE — 3700000000 HC ANESTHESIA ATTENDED CARE: Performed by: PLASTIC SURGERY

## 2023-11-13 PROCEDURE — 88305 TISSUE EXAM BY PATHOLOGIST: CPT

## 2023-11-13 PROCEDURE — 3700000001 HC ADD 15 MINUTES (ANESTHESIA): Performed by: PLASTIC SURGERY

## 2023-11-13 PROCEDURE — 6370000000 HC RX 637 (ALT 250 FOR IP): Performed by: ANESTHESIOLOGY

## 2023-11-13 PROCEDURE — C2626 INFUSION PUMP, NON-PROG,TEMP: HCPCS | Performed by: PLASTIC SURGERY

## 2023-11-13 PROCEDURE — 7100000010 HC PHASE II RECOVERY - FIRST 15 MIN: Performed by: PLASTIC SURGERY

## 2023-11-13 RX ORDER — GLYCOPYRROLATE 0.2 MG/ML
INJECTION INTRAMUSCULAR; INTRAVENOUS PRN
Status: DISCONTINUED | OUTPATIENT
Start: 2023-11-13 | End: 2023-11-13 | Stop reason: SDUPTHER

## 2023-11-13 RX ORDER — ONDANSETRON 2 MG/ML
INJECTION INTRAMUSCULAR; INTRAVENOUS PRN
Status: DISCONTINUED | OUTPATIENT
Start: 2023-11-13 | End: 2023-11-13 | Stop reason: SDUPTHER

## 2023-11-13 RX ORDER — LIDOCAINE HYDROCHLORIDE 20 MG/ML
INJECTION, SOLUTION EPIDURAL; INFILTRATION; INTRACAUDAL; PERINEURAL PRN
Status: DISCONTINUED | OUTPATIENT
Start: 2023-11-13 | End: 2023-11-13 | Stop reason: SDUPTHER

## 2023-11-13 RX ORDER — MIDAZOLAM HYDROCHLORIDE 2 MG/2ML
2 INJECTION, SOLUTION INTRAMUSCULAR; INTRAVENOUS
Status: DISCONTINUED | OUTPATIENT
Start: 2023-11-13 | End: 2023-11-13 | Stop reason: HOSPADM

## 2023-11-13 RX ORDER — OXYCODONE HYDROCHLORIDE 5 MG/1
5 TABLET ORAL
Status: COMPLETED | OUTPATIENT
Start: 2023-11-13 | End: 2023-11-13

## 2023-11-13 RX ORDER — SODIUM CHLORIDE 0.9 % (FLUSH) 0.9 %
5-40 SYRINGE (ML) INJECTION PRN
Status: DISCONTINUED | OUTPATIENT
Start: 2023-11-13 | End: 2023-11-13 | Stop reason: HOSPADM

## 2023-11-13 RX ORDER — HYDROMORPHONE HYDROCHLORIDE 2 MG/ML
0.5 INJECTION, SOLUTION INTRAMUSCULAR; INTRAVENOUS; SUBCUTANEOUS EVERY 5 MIN PRN
Status: COMPLETED | OUTPATIENT
Start: 2023-11-13 | End: 2023-11-13

## 2023-11-13 RX ORDER — FENTANYL CITRATE 50 UG/ML
100 INJECTION, SOLUTION INTRAMUSCULAR; INTRAVENOUS
Status: DISCONTINUED | OUTPATIENT
Start: 2023-11-13 | End: 2023-11-13 | Stop reason: HOSPADM

## 2023-11-13 RX ORDER — DEXAMETHASONE SODIUM PHOSPHATE 4 MG/ML
INJECTION, SOLUTION INTRA-ARTICULAR; INTRALESIONAL; INTRAMUSCULAR; INTRAVENOUS; SOFT TISSUE PRN
Status: DISCONTINUED | OUTPATIENT
Start: 2023-11-13 | End: 2023-11-13 | Stop reason: SDUPTHER

## 2023-11-13 RX ORDER — ONDANSETRON 2 MG/ML
4 INJECTION INTRAMUSCULAR; INTRAVENOUS
Status: DISCONTINUED | OUTPATIENT
Start: 2023-11-13 | End: 2023-11-13 | Stop reason: HOSPADM

## 2023-11-13 RX ORDER — SODIUM CHLORIDE 9 MG/ML
INJECTION, SOLUTION INTRAVENOUS PRN
Status: DISCONTINUED | OUTPATIENT
Start: 2023-11-13 | End: 2023-11-13 | Stop reason: HOSPADM

## 2023-11-13 RX ORDER — EPHEDRINE SULFATE/0.9% NACL/PF 50 MG/5 ML
SYRINGE (ML) INTRAVENOUS PRN
Status: DISCONTINUED | OUTPATIENT
Start: 2023-11-13 | End: 2023-11-13 | Stop reason: SDUPTHER

## 2023-11-13 RX ORDER — SODIUM CHLORIDE 0.9 % (FLUSH) 0.9 %
5-40 SYRINGE (ML) INJECTION EVERY 12 HOURS SCHEDULED
Status: DISCONTINUED | OUTPATIENT
Start: 2023-11-13 | End: 2023-11-13 | Stop reason: HOSPADM

## 2023-11-13 RX ORDER — SODIUM CHLORIDE, SODIUM LACTATE, POTASSIUM CHLORIDE, CALCIUM CHLORIDE 600; 310; 30; 20 MG/100ML; MG/100ML; MG/100ML; MG/100ML
INJECTION, SOLUTION INTRAVENOUS CONTINUOUS
Status: DISCONTINUED | OUTPATIENT
Start: 2023-11-13 | End: 2023-11-13 | Stop reason: HOSPADM

## 2023-11-13 RX ORDER — DIPHENHYDRAMINE HYDROCHLORIDE 50 MG/ML
12.5 INJECTION INTRAMUSCULAR; INTRAVENOUS
Status: DISCONTINUED | OUTPATIENT
Start: 2023-11-13 | End: 2023-11-13 | Stop reason: HOSPADM

## 2023-11-13 RX ORDER — NEOSTIGMINE METHYLSULFATE 1 MG/ML
INJECTION, SOLUTION INTRAVENOUS PRN
Status: DISCONTINUED | OUTPATIENT
Start: 2023-11-13 | End: 2023-11-13 | Stop reason: SDUPTHER

## 2023-11-13 RX ORDER — BUPIVACAINE HYDROCHLORIDE 5 MG/ML
INJECTION, SOLUTION EPIDURAL; INTRACAUDAL PRN
Status: DISCONTINUED | OUTPATIENT
Start: 2023-11-13 | End: 2023-11-13 | Stop reason: HOSPADM

## 2023-11-13 RX ORDER — PROPOFOL 10 MG/ML
INJECTION, EMULSION INTRAVENOUS PRN
Status: DISCONTINUED | OUTPATIENT
Start: 2023-11-13 | End: 2023-11-13 | Stop reason: SDUPTHER

## 2023-11-13 RX ORDER — MIDAZOLAM HYDROCHLORIDE 1 MG/ML
INJECTION INTRAMUSCULAR; INTRAVENOUS PRN
Status: DISCONTINUED | OUTPATIENT
Start: 2023-11-13 | End: 2023-11-13 | Stop reason: SDUPTHER

## 2023-11-13 RX ORDER — IBUPROFEN 200 MG
TABLET ORAL PRN
Status: DISCONTINUED | OUTPATIENT
Start: 2023-11-13 | End: 2023-11-13 | Stop reason: HOSPADM

## 2023-11-13 RX ORDER — ROCURONIUM BROMIDE 10 MG/ML
INJECTION, SOLUTION INTRAVENOUS PRN
Status: DISCONTINUED | OUTPATIENT
Start: 2023-11-13 | End: 2023-11-13 | Stop reason: SDUPTHER

## 2023-11-13 RX ADMIN — HYDROMORPHONE HYDROCHLORIDE 0.5 MG: 2 INJECTION, SOLUTION INTRAMUSCULAR; INTRAVENOUS; SUBCUTANEOUS at 11:29

## 2023-11-13 RX ADMIN — OXYCODONE 5 MG: 5 TABLET ORAL at 12:11

## 2023-11-13 RX ADMIN — DEXAMETHASONE SODIUM PHOSPHATE 4 MG: 4 INJECTION, SOLUTION INTRAMUSCULAR; INTRAVENOUS at 08:51

## 2023-11-13 RX ADMIN — FENTANYL CITRATE 50 MCG: 50 INJECTION INTRAMUSCULAR; INTRAVENOUS at 08:12

## 2023-11-13 RX ADMIN — ROCURONIUM BROMIDE 10 MG: 50 INJECTION, SOLUTION INTRAVENOUS at 09:38

## 2023-11-13 RX ADMIN — HYDROMORPHONE HYDROCHLORIDE 0.5 MG: 2 INJECTION, SOLUTION INTRAMUSCULAR; INTRAVENOUS; SUBCUTANEOUS at 12:15

## 2023-11-13 RX ADMIN — LIDOCAINE HYDROCHLORIDE 100 MG: 20 INJECTION, SOLUTION EPIDURAL; INFILTRATION; INTRACAUDAL; PERINEURAL at 08:12

## 2023-11-13 RX ADMIN — Medication 2 G: at 08:29

## 2023-11-13 RX ADMIN — Medication 10 MG: at 08:52

## 2023-11-13 RX ADMIN — MIDAZOLAM 2 MG: 1 INJECTION INTRAMUSCULAR; INTRAVENOUS at 08:07

## 2023-11-13 RX ADMIN — HYDROMORPHONE HYDROCHLORIDE 0.5 MG: 2 INJECTION, SOLUTION INTRAMUSCULAR; INTRAVENOUS; SUBCUTANEOUS at 11:58

## 2023-11-13 RX ADMIN — Medication 3 MG: at 10:57

## 2023-11-13 RX ADMIN — ONDANSETRON 4 MG: 2 INJECTION INTRAMUSCULAR; INTRAVENOUS at 10:14

## 2023-11-13 RX ADMIN — HYDROMORPHONE HYDROCHLORIDE 0.5 MG: 2 INJECTION, SOLUTION INTRAMUSCULAR; INTRAVENOUS; SUBCUTANEOUS at 11:36

## 2023-11-13 RX ADMIN — SODIUM CHLORIDE, SODIUM LACTATE, POTASSIUM CHLORIDE, AND CALCIUM CHLORIDE: 600; 310; 30; 20 INJECTION, SOLUTION INTRAVENOUS at 06:43

## 2023-11-13 RX ADMIN — Medication 15 MG: at 09:41

## 2023-11-13 RX ADMIN — PHENYLEPHRINE HYDROCHLORIDE 100 MCG: 10 INJECTION INTRAVENOUS at 08:30

## 2023-11-13 RX ADMIN — HYDROMORPHONE HYDROCHLORIDE 0.5 MG: 2 INJECTION, SOLUTION INTRAMUSCULAR; INTRAVENOUS; SUBCUTANEOUS at 11:52

## 2023-11-13 RX ADMIN — PROPOFOL 170 MG: 10 INJECTION, EMULSION INTRAVENOUS at 08:12

## 2023-11-13 RX ADMIN — GLYCOPYRROLATE 0.4 MG: 0.2 INJECTION INTRAMUSCULAR; INTRAVENOUS at 10:57

## 2023-11-13 RX ADMIN — SODIUM CHLORIDE, SODIUM LACTATE, POTASSIUM CHLORIDE, AND CALCIUM CHLORIDE: 600; 310; 30; 20 INJECTION, SOLUTION INTRAVENOUS at 10:46

## 2023-11-13 RX ADMIN — Medication 10 MG: at 09:27

## 2023-11-13 RX ADMIN — Medication 10 MG: at 08:30

## 2023-11-13 RX ADMIN — HYDROMORPHONE HYDROCHLORIDE 0.5 MG: 2 INJECTION, SOLUTION INTRAMUSCULAR; INTRAVENOUS; SUBCUTANEOUS at 12:25

## 2023-11-13 RX ADMIN — PROPOFOL 30 MG: 10 INJECTION, EMULSION INTRAVENOUS at 09:38

## 2023-11-13 RX ADMIN — ROCURONIUM BROMIDE 50 MG: 50 INJECTION, SOLUTION INTRAVENOUS at 08:12

## 2023-11-13 RX ADMIN — FENTANYL CITRATE 50 MCG: 50 INJECTION INTRAMUSCULAR; INTRAVENOUS at 10:58

## 2023-11-13 ASSESSMENT — PAIN - FUNCTIONAL ASSESSMENT: PAIN_FUNCTIONAL_ASSESSMENT: 0-10

## 2023-11-13 ASSESSMENT — PAIN SCALES - GENERAL
PAINLEVEL_OUTOF10: 8
PAINLEVEL_OUTOF10: 9
PAINLEVEL_OUTOF10: 4

## 2023-11-13 ASSESSMENT — PAIN DESCRIPTION - LOCATION: LOCATION: BREAST

## 2023-11-13 NOTE — PERIOP NOTE
[employer] rep in pre op. Interrogated pt's pacemaker/defibrillator and defibrillator turned off. Pads placed on patient and she is on external defibrillator .  at bedside. HR 60. VSS. Prem Gomez can be reached at [phone] when patient is ready to be discharged.

## 2023-11-13 NOTE — ANESTHESIA PRE PROCEDURE
Department of Anesthesiology  Preprocedure Note       Name:  Coral Mike   Age:  47 y.o.  :  1970                                          MRN:  [id number]         Date:  2023      Surgeon: Lelia Landa):  Nichol Fermin MD    Procedure: Procedure(s):  BILATERAL BREAST MAMMOPLASTY REDUCTION, [employer] ICD REP DOS    Medications prior to admission:   Prior to Admission medications    Medication Sig Start Date End Date Taking?  Authorizing Provider   Magnesium 400 MG CAPS Take by mouth at bedtime    Cuca Sharma MD   acetaminophen (TYLENOL) 500 MG tablet Take 1 tablet by mouth every 6 hours as needed for Pain    Cuca Sharma MD   DM-APAP-CPM (CORICIDIN HBP MAX STRENGTH FLU PO) Take by mouth as needed    Cuca Sharma MD   Peppermint Oil (IBGARD PO) Take by mouth daily    Cuca Sharma MD   Garlic 2347 MG CAPS Take by mouth daily    Cuca Sharma MD   b complex vitamins capsule Take 1 capsule by mouth daily    Cuca Sharma MD   ascorbic acid (VITAMIN C) 500 MG tablet Take 1 tablet by mouth daily    Cuca Sharma MD   VITAMIN E PO Take by mouth daily    Cuca Sharma MD   BIOTIN PO Take by mouth daily    Cuca Sharma MD   Probiotic Product (PROBIOTIC DAILY PO) Take by mouth    Cuca Sharma MD   clotrimazole-betamethasone (LOTRISONE) 1-0.05 % cream APPLY TOPICALLY TO THE AFFECTED AREA TWICE DAILY FOR 7 DAYS 23   Cuca Sharma MD   terbinafine (LAMISIL) 250 MG tablet  23   Cuca Sharma MD   sacubitril-valsartan (ENTRESTO) 49-51 MG per tablet Take 1 tablet by mouth daily  Patient taking differently: Take 2 tablets by mouth daily 1/10/23   Jaime Tomlinson MD   metoprolol succinate (TOPROL XL) 25 MG extended release tablet Take 0.5 tablets by mouth daily  Patient taking differently: Take 0.5 tablets by mouth at bedtime 1/10/23   Jaime Tomlinson MD

## 2023-11-13 NOTE — DISCHARGE INSTRUCTIONS
Leave dressing in place for 48 hours, then may sponge bathe. Apply bacitracin and gauze daily. Wear bra to hold dressings and for support. Leave pain pump in place. MEDICATION INTERACTION:  During your procedure you potentially received a medication or medications which may reduce the effectiveness of oral contraceptives. Please consider other forms of contraception for 1 month following your procedure if you are currently using oral contraceptives as your primary form of birth control. In addition to this, we recommend continuing your oral contraceptive as prescribed, unless otherwise instructed by your physician, during this time    After general anesthesia or intravenous sedation, for 24 hours or while taking prescription Narcotics:  Limit your activities  Some people will feel drowsy or dizzy for up to a few hours after waking up. A responsible adult needs to be with you for the next 24 hours  Do not drive and operate hazardous machinery  Do not make important personal or business decisions  Do not drink alcoholic beverages  If you have not urinated within 8 hours after discharge, and you are experiencing discomfort from urinary retention, please go to the nearest ED. If you have sleep apnea and have a CPAP machine, please use it for all naps and sleeping. Please use caution when taking narcotics and any of your home medications that may cause drowsiness. *  Please give a list of your current medications to your Primary Care Provider. *  Please update this list whenever your medications are discontinued, doses are      changed, or new medications (including over-the-counter products) are added. *  Please carry medication information at all times in case of emergency situations.     These are general instructions for a healthy lifestyle:  No smoking/ No tobacco products/ Avoid exposure to second hand smoke  Surgeon General's Warning:  Quitting smoking now greatly reduces serious risk to your

## 2023-11-13 NOTE — BRIEF OP NOTE
Brief Postoperative Note      Patient: Julian Black  YOB: 1970  MRN: [id number]    Date of Procedure: 11/13/2023    Pre-Op Diagnosis Codes:     * Hypertrophy of breast [N62]     * Other specified dorsopathies, cervical region [M53.82]     * Low back pain, unspecified back pain laterality, unspecified chronicity, unspecified whether sciatica present [M54.50]     * Intertrigo [L30.4]     * Mastodynia [N64.4]    Post-Op Diagnosis: Same       Procedure(s):  BILATERAL BREAST MAMMOPLASTY REDUCTION, [employer] ICD REP DOS    Surgeon(s):  Molli Sandhoff, MD    Assistant:  * No surgical staff found *    Anesthesia: General    Estimated Blood Loss (mL): less than 50     Complications: None    Specimens:   ID Type Source Tests Collected by Time Destination   A : Right Breast Tissue Breast SURGICAL PATHOLOGY Molli Sandhoff, MD 11/13/2023 9924    B : Left Breast Tissue Breast SURGICAL PATHOLOGY Molli Sandhoff, MD 11/13/2023 4746        Implants:  * No implants in log *      Drains: * No LDAs found *    Findings: none      Electronically signed by Molli Sandhoff, MD on 11/13/2023 at 10:50 AM

## 2023-11-13 NOTE — ANESTHESIA PROCEDURE NOTES
Arterial Line:    An arterial line was placed using surface landmarks, in the OR for the following indication(s): continuous blood pressure monitoring. A 20 gauge (size), 1 and 3/4 inch (length), Arrow (type) catheter was placed, Seldinger technique not used, into the left radial artery, secured by Tegaderm. Anesthesia type: General    Events:  patient tolerated procedure well with no complications. 11/13/2023 8:20 AM11/13/2023 8:24 AM  Resident/CRNA: NICO Ortez CRNA  Performed: Resident/CRNA   Preanesthetic Checklist  Completed: patient identified, IV checked, site marked, risks and benefits discussed, surgical/procedural consents, equipment checked, pre-op evaluation, timeout performed, anesthesia consent given, oxygen available, monitors applied/VS acknowledged, fire risk safety assessment completed and verbalized and blood product R/B/A discussed and consented

## 2023-11-13 NOTE — ANESTHESIA PROCEDURE NOTES
Airway  Date/Time: 11/13/2023 8:14 AM  Urgency: elective    Airway not difficult    General Information and Staff    Patient location during procedure: OR  Resident/CRNA: NICO Brennan CRNA  Performed: resident/CRNA   Performed by: NICO Brennan CRNA  Authorized by: Alma Villavicencio MD      Indications and Patient Condition  Indications for airway management: anesthesia  Spontaneous Ventilation: absent  Sedation level: deep  Preoxygenated: yes  Patient position: sniffing  MILS not maintained throughout  Mask difficulty assessment: vent by bag mask    Final Airway Details  Final airway type: endotracheal airway      Successful airway: ETT  Cuffed: yes   Successful intubation technique: direct laryngoscopy  Facilitating devices/methods: intubating stylet  Endotracheal tube insertion site: oral  Blade: Elodia  Blade size: #4  Cormack-Lehane Classification: grade I - full view of glottis  Placement verified by: chest auscultation and capnometry   Measured from: lips  ETT to lips (cm): 21  Number of attempts at approach: 1  Ventilation between attempts: bag mask  Number of other approaches attempted: 0

## 2023-11-20 NOTE — OP NOTE
[facility]  OPERATIVE REPORT    Name:  Olamide Jaimes  MR#:  [id number]  :  1970  ACCOUNT #:  [de-identified]  DATE OF SERVICE:  2023    PREOPERATIVE DIAGNOSIS:  Symptomatic macromastia. POSTOPERATIVE DIAGNOSIS:  Symptomatic macromastia. PROCEDURE PERFORMED:  Bilateral breast reduction. SURGEON:  Yvan Chirinos MD    ASSISTANT: Shane Pineda CST    ANESTHESIA:  General.    COMPLICATIONS:  None. SPECIMENS REMOVED:  Left breast tissue 870 g. Right breast tissue 840 g. IMPLANTS:  none    ESTIMATED BLOOD LOSS:  Minimal.    INDICATIONS:  The patient presents with symptomatic macromastia, desiring a breast reduction. Please see preoperative consultation notes for details of our discussions. PROCEDURE:  After informed consent was obtained from the patient, she was marked in the holding area in the upright position. She was then taken to the operating room and placed in the supine position and prepped and draped in the usual fashion. I began on the right side where an 8 cm pedicle was centered on the breast mound. This was then deepithelialized with a 10-blade. Bovie cautery was used to dissect through the medial breast tissue down to the level of the chest wall. A 10-blade was used to further incise the skin. Bovie cautery was used to complete the medial wedge resection. The same technique was used for the lateral wedge and then superiorly skin flaps were retracted towards the ceiling. 2 to 3 cm thick skin flaps were dissected up to the level of the pectoralis fascia. The pedicle was then retracted towards the ceiling and the superior portion was transected. Additional resection and shaping was done as indicated. The pocket was then inspected for hemostasis and then a pain pump catheter was placed and secured with a 4-0 nylon stitch. She was temporarily tailor-tacked in an inverted T fashion.   The same procedure was repeated on the opposite breast.  She was

## 2023-12-19 PROCEDURE — 93296 REM INTERROG EVL PM/IDS: CPT | Performed by: INTERNAL MEDICINE

## 2023-12-19 PROCEDURE — 93295 DEV INTERROG REMOTE 1/2/MLT: CPT | Performed by: INTERNAL MEDICINE

## 2024-01-11 NOTE — PROGRESS NOTES
Provider, MD Cuca   COD LIVER OIL PO Take by mouth   Yes Automatic Reconciliation, Ar   cetirizine (ZYRTEC) 10 MG tablet Take 1 tablet by mouth as needed   Yes Automatic Reconciliation, Ar   Cholecalciferol 50 MCG (2000 UT) CAPS Take by mouth daily   Yes Automatic Reconciliation, Ar   diclofenac sodium (VOLTAREN) 1 % GEL Apply 1-2 g topically 3 times daily 6/13/21  Yes Automatic Reconciliation, Ar   dicyclomine (BENTYL) 10 MG capsule Take 1 capsule by mouth 4 times daily as needed   Yes Automatic Reconciliation, Ar   FLUoxetine (PROZAC) 40 MG capsule Take by mouth daily   Yes Automatic Reconciliation, Ar   fluticasone (FLONASE) 50 MCG/ACT nasal spray 1 spray by Nasal route 2 times daily as needed 1/19/21  Yes Automatic Reconciliation, Ar   guaiFENesin 400 MG tablet Take 1 tablet by mouth in the morning and at bedtime   Yes Automatic Reconciliation, Ar   itraconazole (SPORANOX) 100 MG capsule Take 2 capsules by mouth as needed   Yes Automatic Reconciliation, Ar   linaclotide (LINZESS) 290 MCG CAPS capsule Take 1 capsule by mouth daily as needed 4/8/19  Yes Automatic Reconciliation, Ar   melatonin 10 MG CAPS capsule Take 1 capsule by mouth   Yes Automatic Reconciliation, Ar   omeprazole (PRILOSEC) 40 MG delayed release capsule Take 1 capsule by mouth daily   Yes Automatic Reconciliation, Ar   polyethyl glycol-propyl glycol 0.4-0.3 % (SYSTANE) 0.4-0.3 % ophthalmic solution 1 drop as needed   Yes Automatic Reconciliation, Ar   sodium chloride (OCEAN) 0.65 % nasal spray 2 sprays as needed   Yes Automatic Reconciliation, Ar   Simethicone 125 MG CAPS Take 1 capsule by mouth 4 times daily as needed Takes twice a day   Yes Automatic Reconciliation, Ar   triamcinolone (NASACORT) 55 MCG/ACT nasal inhaler 2 sprays daily as needed   Yes Automatic Reconciliation, Ar     Allergies   Allergen Reactions    Farxiga [Dapagliflozin]      Overall didn't feel well    Penicillins Swelling    Hydrocodone-Acetaminophen Itching

## 2024-01-12 ENCOUNTER — OFFICE VISIT (OUTPATIENT)
Age: 54
End: 2024-01-12
Payer: COMMERCIAL

## 2024-01-12 VITALS
WEIGHT: 154 LBS | HEIGHT: 65 IN | HEART RATE: 72 BPM | BODY MASS INDEX: 25.66 KG/M2 | DIASTOLIC BLOOD PRESSURE: 88 MMHG | SYSTOLIC BLOOD PRESSURE: 130 MMHG

## 2024-01-12 DIAGNOSIS — I95.89 IATROGENIC HYPOTENSION: ICD-10-CM

## 2024-01-12 DIAGNOSIS — Z95.810 AICD (AUTOMATIC CARDIOVERTER/DEFIBRILLATOR) PRESENT: ICD-10-CM

## 2024-01-12 DIAGNOSIS — I42.8 NICM (NONISCHEMIC CARDIOMYOPATHY) (HCC): Primary | ICD-10-CM

## 2024-01-12 PROCEDURE — 99214 OFFICE O/P EST MOD 30 MIN: CPT | Performed by: INTERNAL MEDICINE

## 2024-01-12 RX ORDER — SACUBITRIL AND VALSARTAN 49; 51 MG/1; MG/1
1 TABLET, FILM COATED ORAL 2 TIMES DAILY
Qty: 180 TABLET | Refills: 3 | Status: SHIPPED | OUTPATIENT
Start: 2024-01-12

## 2024-01-12 RX ORDER — METOPROLOL SUCCINATE 25 MG/1
12.5 TABLET, EXTENDED RELEASE ORAL NIGHTLY
Qty: 45 TABLET | Refills: 3 | Status: SHIPPED | OUTPATIENT
Start: 2024-01-12

## 2024-01-12 ASSESSMENT — ENCOUNTER SYMPTOMS: SHORTNESS OF BREATH: 1

## 2024-01-12 NOTE — PATIENT INSTRUCTIONS
liability for your use of this information.       Patient Education        Walking for Exercise: Care Instructions  Overview     Walking is one of the easiest ways to get the exercise you need for good health. A brisk, 30-minute walk each day can help you feel better and have more energy. It can help you lower your risk of disease. Walking can help you keep your bones strong and your heart healthy.  Check with your doctor before you start a walking plan if you have heart problems, other health issues, or you have not been active in a long time. Follow your doctor's instructions for safe levels of exercise.  Follow-up care is a key part of your treatment and safety. Be sure to make and go to all appointments, and call your doctor if you are having problems. It's also a good idea to know your test results and keep a list of the medicines you take.  How can you care for yourself at home?  Getting started  Start slowly and set a short-term goal. For example, walk for 5 or 10 minutes every day.  Bit by bit, increase the amount you walk every day. Try for at least 30 minutes on most days of the week. You also may want to swim, bike, or do other activities.  If finding enough time is a problem, it's fine to be active in shorter periods of time throughout your day.  To get the heart-healthy benefits of walking, you need to walk briskly enough to increase your heart rate and breathing, but not so fast that you can't talk comfortably.  Wear comfortable shoes that fit well and provide good support for your feet and ankles.  Staying with your plan  After you've made walking a habit, set a longer-term goal. You may want to set a goal of walking briskly for longer or walking farther. Experts say to do 2½ hours (150 minutes) of moderate activity a week. A faster heartbeat is what defines moderate-level activity.  To stay motivated, walk with friends, coworkers, or pets.  Use a phone rodrigo, pedometer, or wearable device to track your

## 2024-03-28 PROCEDURE — 93296 REM INTERROG EVL PM/IDS: CPT | Performed by: INTERNAL MEDICINE

## 2024-03-28 PROCEDURE — 93295 DEV INTERROG REMOTE 1/2/MLT: CPT | Performed by: INTERNAL MEDICINE

## 2024-05-06 RX ORDER — SPIRONOLACTONE 25 MG/1
25 TABLET ORAL DAILY
Qty: 90 TABLET | Refills: 3 | Status: SHIPPED | OUTPATIENT
Start: 2024-05-06

## 2024-07-01 PROCEDURE — 93295 DEV INTERROG REMOTE 1/2/MLT: CPT | Performed by: INTERNAL MEDICINE

## 2024-07-01 PROCEDURE — 93296 REM INTERROG EVL PM/IDS: CPT | Performed by: INTERNAL MEDICINE

## 2024-07-29 NOTE — PROGRESS NOTES
[facility] CARDIOLOGY  [address] [place] [address], [address]  [place] [address]  PHONE: [phone]      24    NAME:  Asia Rushing  : 1970  MRN: [id number]         SUBJECTIVE:   Asia Rushing is a 53 y.o. female seen for a follow up visit regarding the following:     Chief Complaint   Patient presents with    Cardiomyopathy    Results     echo            HPI:  Follow up  Cardiomyopathy and Results (echo)   .    Follow up NICM with HFrEF,   Poorly tolerant of meds with hypotension but thus far has tolerated spironolactone, BB, and low dose ARNI.  Did NOT tolerate SGLT2i . EF stable, minimal RV pacing.     She feels about the same as ever, chronic fatigue, diffuse pain            Past cardiac history:   Poorly tolerant of beta blockade   2015 -  normal perfusion after 9 minutes, stage III, but was read as an EF of 43%, done for fatigue and dyspnea   May 2015 - Echo - EF confirmed 40-45%, no valve disease, RVSP 22, diastolic function could not be graded.    2015 - Cath - normal coronary arteries, EF low normal 50%, LVEDP 12   Oct 2016 - EF 45%, no valve disease, normal 24 hour monitor, no symptom correlation   2017 - no change   2018 - 43%   2020 - EF 32%   Aug 2020- EF 35%   Oct 2021- primary prevention ICD - Dr Bocanegra ([place] [employer] [facility])  2022- EF 35-40%  2023       Echo EF 42%, mild MR  2024       EF 35-40%, normal size, impaired relaxation, mildly reduced RV function, mild MR, TR, RVSP 27                Cardiac Medications       Potassium Sparing Diuretics       spironolactone (ALDACTONE) 25 MG tablet TAKE 1 TABLET BY MOUTH EVERY DAY       Beta Blockers Cardio-Selective       metoprolol succinate (TOPROL XL) 25 MG extended release tablet Take 0.5 tablets by mouth at bedtime       HMG CoA Reductase Inhibitors       rosuvastatin (CRESTOR) 10 MG tablet Take 1 tablet by mouth       Cardiovascular Agents Misc. - Combinations

## 2024-07-30 ENCOUNTER — OFFICE VISIT (OUTPATIENT)
Age: 54
End: 2024-07-30
Payer: COMMERCIAL

## 2024-07-30 VITALS
BODY MASS INDEX: 24.51 KG/M2 | HEART RATE: 64 BPM | DIASTOLIC BLOOD PRESSURE: 72 MMHG | WEIGHT: 152.5 LBS | SYSTOLIC BLOOD PRESSURE: 112 MMHG | HEIGHT: 66 IN

## 2024-07-30 DIAGNOSIS — Z95.810 AICD (AUTOMATIC CARDIOVERTER/DEFIBRILLATOR) PRESENT: ICD-10-CM

## 2024-07-30 DIAGNOSIS — I42.8 NICM (NONISCHEMIC CARDIOMYOPATHY) (HCC): Primary | ICD-10-CM

## 2024-07-30 PROCEDURE — 93000 ELECTROCARDIOGRAM COMPLETE: CPT | Performed by: INTERNAL MEDICINE

## 2024-07-30 PROCEDURE — 99214 OFFICE O/P EST MOD 30 MIN: CPT | Performed by: INTERNAL MEDICINE

## 2024-07-30 RX ORDER — ROSUVASTATIN CALCIUM 10 MG/1
10 TABLET, COATED ORAL
COMMUNITY
Start: 2024-06-06 | End: 2025-06-06

## 2024-07-30 RX ORDER — MOMETASONE FUROATE MONOHYDRATE 50 UG/1
2 SPRAY, METERED NASAL DAILY
COMMUNITY

## 2024-07-30 RX ORDER — NAPROXEN 500 MG/1
500 TABLET ORAL 2 TIMES DAILY WITH MEALS
COMMUNITY
Start: 2024-05-31

## 2024-07-30 RX ORDER — DULOXETIN HYDROCHLORIDE 60 MG/1
CAPSULE, DELAYED RELEASE ORAL
COMMUNITY
Start: 2024-07-09

## 2024-07-30 RX ORDER — TRAZODONE HYDROCHLORIDE 50 MG/1
50 TABLET ORAL
COMMUNITY
Start: 2024-07-09 | End: 2024-09-07

## 2024-10-04 PROCEDURE — 93296 REM INTERROG EVL PM/IDS: CPT | Performed by: INTERNAL MEDICINE

## 2024-10-04 PROCEDURE — 93295 DEV INTERROG REMOTE 1/2/MLT: CPT | Performed by: INTERNAL MEDICINE

## 2024-10-14 NOTE — PROGRESS NOTES
[facility] CARDIOLOGY  [address] [place] [address], [address]  [place] [address]  PHONE: [phone]      10/15/24    NAME:  Asia Rushing  : 1970  MRN: [id number]         SUBJECTIVE:   Asia Rushing is a 53 y.o. female seen for a follow up visit regarding the following:     Chief Complaint   Patient presents with    Follow-up    Chest Pain            HPI:  Follow up  Follow-up and Chest Pain   .    Follow up NICM with HFrEF,   Poorly tolerant of meds with hypotension but thus far has tolerated spironolactone, BB, and low dose ARNI.  Did NOT tolerate SGLT2i .  Plagued by chronic fatigue and diffuse pain.  Returns off schedule to request evaluation of pain down her entire left side including the arm and leg with associated numbness.  Present for weeks, may occur on the right side as well but mostly left.  Flares off and on, doesn't take anything for it.  Both knees will swell, no erythema or warmth, no skin rashes.  Some mild itching on her right breast.  No cardiovascular issues, per se.      She's actually already in physical therapy for core strengthening and knee pain.  She's had steroid shots in her elbow and hip, that was about 3 months ago, getting gel next month (Dr Maeve Coker)           Past cardiac history:   Poorly tolerant of beta blockade   2015 -  normal perfusion after 9 minutes, stage III, but was read as an EF of 43%, done for fatigue and dyspnea   May 2015 - Echo - EF confirmed 40-45%, no valve disease, RVSP 22, diastolic function could not be graded.    2015 - Cath - normal coronary arteries, EF low normal 50%, LVEDP 12   Oct 2016 - EF 45%, no valve disease, normal 24 hour monitor, no symptom correlation   2017 - no change   2018 - 43%   2020 - EF 32%   Aug 2020- EF 35%   Oct 2021- primary prevention ICD - Dr Bocanegra ([place] [employer] [facility])  2022- EF 35-40%  2023       Echo EF 42%, mild MR  2024       EF 35-40%, normal size, impaired

## 2024-10-15 ENCOUNTER — OFFICE VISIT (OUTPATIENT)
Age: 54
End: 2024-10-15
Payer: COMMERCIAL

## 2024-10-15 VITALS
BODY MASS INDEX: 24.43 KG/M2 | HEART RATE: 60 BPM | SYSTOLIC BLOOD PRESSURE: 128 MMHG | DIASTOLIC BLOOD PRESSURE: 84 MMHG | WEIGHT: 152 LBS | HEIGHT: 66 IN

## 2024-10-15 DIAGNOSIS — I95.89 IATROGENIC HYPOTENSION: ICD-10-CM

## 2024-10-15 DIAGNOSIS — Z95.810 AICD (AUTOMATIC CARDIOVERTER/DEFIBRILLATOR) PRESENT: ICD-10-CM

## 2024-10-15 DIAGNOSIS — I42.8 NICM (NONISCHEMIC CARDIOMYOPATHY) (HCC): Primary | ICD-10-CM

## 2024-10-15 PROCEDURE — 99214 OFFICE O/P EST MOD 30 MIN: CPT | Performed by: INTERNAL MEDICINE

## 2024-10-15 ASSESSMENT — ENCOUNTER SYMPTOMS: BACK PAIN: 1

## 2024-12-17 ENCOUNTER — NURSE ONLY (OUTPATIENT)
Age: 54
End: 2024-12-17

## 2024-12-17 DIAGNOSIS — I42.8 NICM (NONISCHEMIC CARDIOMYOPATHY) (HCC): Primary | ICD-10-CM

## 2024-12-30 RX ORDER — METOPROLOL SUCCINATE 25 MG/1
TABLET, EXTENDED RELEASE ORAL
Qty: 45 TABLET | Refills: 3 | Status: SHIPPED | OUTPATIENT
Start: 2024-12-30

## 2024-12-30 NOTE — TELEPHONE ENCOUNTER
Requested Prescriptions     Pending Prescriptions Disp Refills    metoprolol succinate (TOPROL XL) 25 MG extended release tablet [Pharmacy Med Name: METOPROLOL ER SUCCINATE 25MG TABS] 45 tablet 3     Sig: TAKE 1/2 TABLET BY MOUTH AT BEDTIME     Rx verified last ov 10/15/24

## 2025-01-29 ENCOUNTER — HOSPITAL ENCOUNTER (OUTPATIENT)
Dept: MRI IMAGING | Age: 55
Discharge: HOME | End: 2025-02-01
Payer: COMMERCIAL

## 2025-01-29 DIAGNOSIS — M23.42 LOOSE BODY OF LEFT KNEE: ICD-10-CM

## 2025-01-29 PROCEDURE — 73721 MRI JNT OF LWR EXTRE W/O DYE: CPT

## 2025-01-29 NOTE — PROGRESS NOTES
Diuretics       spironolactone (ALDACTONE) 25 MG tablet Take 1 tablet by mouth daily       Beta Blockers Cardio-Selective       metoprolol succinate (TOPROL XL) 25 MG extended release tablet Take 0.5 tablets by mouth daily       HMG CoA Reductase Inhibitors       rosuvastatin (CRESTOR) 10 MG tablet Take 1 tablet by mouth       Cardiovascular Agents Misc. - Combinations       sacubitril-valsartan (ENTRESTO) 49-51 MG per tablet Take 1 tablet by mouth 2 times daily                  Past Medical History, Past Surgical History, Family history, Social History, and Medications were all reviewed with the patient today and updated as necessary.     Prior to Admission medications    Medication Sig Start Date End Date Taking? Authorizing Provider   metoprolol succinate (TOPROL XL) 25 MG extended release tablet Take 0.5 tablets by mouth daily 1/30/25  Yes Tina Ledesma MD   sacubitril-valsartan (ENTRESTO) 49-51 MG per tablet Take 1 tablet by mouth 2 times daily 1/30/25  Yes iTna Ledesma MD   spironolactone (ALDACTONE) 25 MG tablet Take 1 tablet by mouth daily 1/30/25  Yes Tina Ledesma MD   DULoxetine (CYMBALTA) 60 MG extended release capsule  7/9/24  Yes Provider, Historical, MD   mometasone (NASONEX) 50 MCG/ACT nasal spray 2 sprays by Nasal route daily   Yes Provider, MD Cuca   naproxen (NAPROSYN) 500 MG tablet Take 1 tablet by mouth 2 times daily (with meals) 5/31/24  Yes Provider, Historical, MD   rosuvastatin (CRESTOR) 10 MG tablet Take 1 tablet by mouth 6/6/24 6/6/25 Yes Provider, Historical, MD   traZODone (DESYREL) 50 MG tablet Take 1 tablet by mouth 7/9/24 1/30/25 Yes Provider, Historical, MD   Magnesium 400 MG CAPS Take by mouth at bedtime   Yes Provider, Historical, MD   acetaminophen (TYLENOL) 500 MG tablet Take 1 tablet by mouth every 6 hours as needed for Pain   Yes Provider, Historical, MD   DM-APAP-CPM (CORICIDIN HBP MAX STRENGTH FLU PO) Take by mouth as needed   Yes

## 2025-01-30 ENCOUNTER — OFFICE VISIT (OUTPATIENT)
Age: 55
End: 2025-01-30
Payer: COMMERCIAL

## 2025-01-30 VITALS
BODY MASS INDEX: 25.85 KG/M2 | HEIGHT: 65 IN | WEIGHT: 155.13 LBS | DIASTOLIC BLOOD PRESSURE: 80 MMHG | SYSTOLIC BLOOD PRESSURE: 120 MMHG | HEART RATE: 64 BPM

## 2025-01-30 DIAGNOSIS — I95.89 IATROGENIC HYPOTENSION: ICD-10-CM

## 2025-01-30 DIAGNOSIS — I42.8 NICM (NONISCHEMIC CARDIOMYOPATHY) (HCC): Primary | ICD-10-CM

## 2025-01-30 DIAGNOSIS — Z95.810 AICD (AUTOMATIC CARDIOVERTER/DEFIBRILLATOR) PRESENT: ICD-10-CM

## 2025-01-30 DIAGNOSIS — Z01.818 PRE-OP EVALUATION: ICD-10-CM

## 2025-01-30 PROCEDURE — 99214 OFFICE O/P EST MOD 30 MIN: CPT | Performed by: INTERNAL MEDICINE

## 2025-01-30 RX ORDER — SACUBITRIL AND VALSARTAN 49; 51 MG/1; MG/1
1 TABLET, FILM COATED ORAL 2 TIMES DAILY
Qty: 180 TABLET | Refills: 3 | Status: SHIPPED | OUTPATIENT
Start: 2025-01-30

## 2025-01-30 RX ORDER — METOPROLOL SUCCINATE 25 MG/1
12.5 TABLET, EXTENDED RELEASE ORAL DAILY
Qty: 45 TABLET | Refills: 3 | Status: SHIPPED | OUTPATIENT
Start: 2025-01-30

## 2025-01-30 RX ORDER — SPIRONOLACTONE 25 MG/1
25 TABLET ORAL DAILY
Qty: 90 TABLET | Refills: 3 | Status: SHIPPED | OUTPATIENT
Start: 2025-01-30

## 2025-02-24 RX ORDER — SACUBITRIL AND VALSARTAN 49; 51 MG/1; MG/1
1 TABLET, FILM COATED ORAL 2 TIMES DAILY
Qty: 300 TABLET | Refills: 0 | Status: SHIPPED | OUTPATIENT
Start: 2025-02-24

## 2025-05-01 PROCEDURE — 93296 REM INTERROG EVL PM/IDS: CPT | Performed by: INTERNAL MEDICINE

## 2025-05-01 PROCEDURE — 93295 DEV INTERROG REMOTE 1/2/MLT: CPT | Performed by: INTERNAL MEDICINE

## 2025-05-12 NOTE — PROGRESS NOTES
[facility] CARDIOLOGY  [address] [place] [address], [address]  [place] [address]  PHONE: [phone]      25    NAME:  Asia Rushing  : 1970  MRN: [id number]         SUBJECTIVE:   Asai Rushing is a 54 y.o. female seen for a follow up visit regarding the following:     Chief Complaint   Patient presents with    Follow-up    Cardiomyopathy            HPI:  Follow up  Follow-up and Cardiomyopathy   .    Follow up NICM with HFrEF,   Poorly tolerant of meds with hypotension but thus far has tolerated spironolactone, BB, and low dose ARNI.  Did NOT tolerate SGLT2i .  Plagued by chronic fatigue and diffuse pain.      Around 2 am 2 weeks ago, she got up to go to the bathroom, never made and passed out having briefly felt dizzy.  Her partner was there and heard her fall, she had awakened by the time he got there which was almost immediate  She was aware but foggy thinking  and dizzy.  She didn't feel well the whole day after that.  Slowly got better through the day.  No loss of continence.  No ICD discharge and no palpitations.  She has been feeling chronic nausea and abdominal pain.  Her PCP has ordered an abdominal ultrasound at [facility] but not yet scheduled.  She has abdominal pain frequently, but it is worse after she eats. Has been present for a month.  She's long had constipation and that hasn't changed.  She's obviously eating less because of the pain. Actually feels better if she consumes acidic foods like citrus fruits and vinegar based foods.  Hasn't been eating protein and overall just eating less because of the symptoms, hydrating less,  had meat for the first time over this past weekend, ate a steak, pork and beans and a salad but had the nausea and pain again afterwards.  Weight is stable thus far.  She feels more fatigued in general, but of course isn't eating well.        I spoke with device clinic.  There have been no alerts from her device, transmissions were just

## 2025-05-13 ENCOUNTER — OFFICE VISIT (OUTPATIENT)
Age: 55
End: 2025-05-13
Payer: COMMERCIAL

## 2025-05-13 VITALS
HEART RATE: 80 BPM | DIASTOLIC BLOOD PRESSURE: 70 MMHG | BODY MASS INDEX: 25.33 KG/M2 | WEIGHT: 152 LBS | HEIGHT: 65 IN | SYSTOLIC BLOOD PRESSURE: 112 MMHG

## 2025-05-13 DIAGNOSIS — I42.8 NICM (NONISCHEMIC CARDIOMYOPATHY) (HCC): Primary | ICD-10-CM

## 2025-05-13 DIAGNOSIS — I95.89 IATROGENIC HYPOTENSION: ICD-10-CM

## 2025-05-13 DIAGNOSIS — I50.22 CHRONIC HFREF (HEART FAILURE WITH REDUCED EJECTION FRACTION) (HCC): ICD-10-CM

## 2025-05-13 DIAGNOSIS — Z95.810 AICD (AUTOMATIC CARDIOVERTER/DEFIBRILLATOR) PRESENT: ICD-10-CM

## 2025-05-13 PROCEDURE — 99214 OFFICE O/P EST MOD 30 MIN: CPT | Performed by: INTERNAL MEDICINE

## 2025-05-13 ASSESSMENT — ENCOUNTER SYMPTOMS
CHANGE IN BOWEL HABIT: 0
NAUSEA: 1
CONSTIPATION: 1

## 2025-06-09 ENCOUNTER — RESULTS FOLLOW-UP (OUTPATIENT)
Dept: CARDIOLOGY | Age: 55
End: 2025-06-09

## 2025-07-07 ENCOUNTER — HOSPITAL ENCOUNTER (INPATIENT)
Age: 55
LOS: 1 days | Discharge: HOME | DRG: 309 | End: 2025-07-08
Attending: INTERNAL MEDICINE | Admitting: INTERNAL MEDICINE
Payer: COMMERCIAL

## 2025-07-07 DIAGNOSIS — I47.20 VT (VENTRICULAR TACHYCARDIA) (HCC): ICD-10-CM

## 2025-07-07 PROBLEM — Z45.02 DEFIBRILLATOR DISCHARGE: Status: ACTIVE | Noted: 2025-07-07

## 2025-07-07 LAB
ANION GAP SERPL CALC-SCNC: 14 MMOL/L (ref 7–16)
BASOPHILS # BLD: 0.02 K/UL (ref 0–0.2)
BASOPHILS NFR BLD: 0.3 % (ref 0–2)
BUN SERPL-MCNC: 9 MG/DL (ref 6–23)
CALCIUM SERPL-MCNC: 9.9 MG/DL (ref 8.8–10.2)
CHLORIDE SERPL-SCNC: 102 MMOL/L (ref 98–107)
CO2 SERPL-SCNC: 20 MMOL/L (ref 20–29)
CREAT SERPL-MCNC: 0.92 MG/DL (ref 0.6–1.1)
DIFFERENTIAL METHOD BLD: ABNORMAL
EOSINOPHIL # BLD: 0.1 K/UL (ref 0–0.8)
EOSINOPHIL NFR BLD: 1.7 % (ref 0.5–7.8)
ERYTHROCYTE [DISTWIDTH] IN BLOOD BY AUTOMATED COUNT: 13.2 % (ref 11.9–14.6)
GLUCOSE SERPL-MCNC: 127 MG/DL (ref 70–99)
HCT VFR BLD AUTO: 41.3 % (ref 35.8–46.3)
HGB BLD-MCNC: 12.8 G/DL (ref 11.7–15.4)
IMM GRANULOCYTES # BLD AUTO: 0.02 K/UL (ref 0–0.5)
IMM GRANULOCYTES NFR BLD AUTO: 0.3 % (ref 0–5)
LYMPHOCYTES # BLD: 1.62 K/UL (ref 0.5–4.6)
LYMPHOCYTES NFR BLD: 28.2 % (ref 13–44)
MAGNESIUM SERPL-MCNC: 1.7 MG/DL (ref 1.8–2.4)
MCH RBC QN AUTO: 28.2 PG (ref 26.1–32.9)
MCHC RBC AUTO-ENTMCNC: 31 G/DL (ref 31.4–35)
MCV RBC AUTO: 91 FL (ref 82–102)
MONOCYTES # BLD: 0.26 K/UL (ref 0.1–1.3)
MONOCYTES NFR BLD: 4.5 % (ref 4–12)
NEUTS SEG # BLD: 3.72 K/UL (ref 1.7–8.2)
NEUTS SEG NFR BLD: 65 % (ref 43–78)
NRBC # BLD: 0 K/UL (ref 0–0.2)
PLATELET # BLD AUTO: 310 K/UL (ref 150–450)
PMV BLD AUTO: 10.1 FL (ref 9.4–12.3)
POTASSIUM SERPL-SCNC: 4.3 MMOL/L (ref 3.5–5.1)
RBC # BLD AUTO: 4.54 M/UL (ref 4.05–5.2)
SODIUM SERPL-SCNC: 136 MMOL/L (ref 136–145)
TROPONIN T SERPL HS-MCNC: <6 NG/L (ref 0–14)
WBC # BLD AUTO: 5.7 K/UL (ref 4.3–11.1)

## 2025-07-07 PROCEDURE — 99222 1ST HOSP IP/OBS MODERATE 55: CPT | Performed by: INTERNAL MEDICINE

## 2025-07-07 PROCEDURE — 80048 BASIC METABOLIC PNL TOTAL CA: CPT

## 2025-07-07 PROCEDURE — 83735 ASSAY OF MAGNESIUM: CPT

## 2025-07-07 PROCEDURE — 84484 ASSAY OF TROPONIN QUANT: CPT

## 2025-07-07 PROCEDURE — 6360000002 HC RX W HCPCS: Performed by: INTERNAL MEDICINE

## 2025-07-07 PROCEDURE — 93005 ELECTROCARDIOGRAM TRACING: CPT | Performed by: PHYSICIAN ASSISTANT

## 2025-07-07 PROCEDURE — 6370000000 HC RX 637 (ALT 250 FOR IP): Performed by: INTERNAL MEDICINE

## 2025-07-07 PROCEDURE — 36415 COLL VENOUS BLD VENIPUNCTURE: CPT

## 2025-07-07 PROCEDURE — 2140000001 HC CVICU INTERMEDIATE R&B

## 2025-07-07 PROCEDURE — 2500000003 HC RX 250 WO HCPCS: Performed by: INTERNAL MEDICINE

## 2025-07-07 PROCEDURE — 85025 COMPLETE CBC W/AUTO DIFF WBC: CPT

## 2025-07-07 RX ORDER — ONDANSETRON 2 MG/ML
4 INJECTION INTRAMUSCULAR; INTRAVENOUS EVERY 6 HOURS PRN
Status: DISCONTINUED | OUTPATIENT
Start: 2025-07-07 | End: 2025-07-08 | Stop reason: HOSPADM

## 2025-07-07 RX ORDER — TRAZODONE HYDROCHLORIDE 50 MG/1
50 TABLET ORAL NIGHTLY PRN
Status: DISCONTINUED | OUTPATIENT
Start: 2025-07-07 | End: 2025-07-08 | Stop reason: HOSPADM

## 2025-07-07 RX ORDER — ONDANSETRON 4 MG/1
4 TABLET, ORALLY DISINTEGRATING ORAL EVERY 8 HOURS PRN
Status: DISCONTINUED | OUTPATIENT
Start: 2025-07-07 | End: 2025-07-08 | Stop reason: HOSPADM

## 2025-07-07 RX ORDER — PANTOPRAZOLE SODIUM 40 MG/1
40 TABLET, DELAYED RELEASE ORAL
Status: DISCONTINUED | OUTPATIENT
Start: 2025-07-08 | End: 2025-07-08 | Stop reason: HOSPADM

## 2025-07-07 RX ORDER — MAGNESIUM SULFATE IN WATER 40 MG/ML
2000 INJECTION, SOLUTION INTRAVENOUS PRN
Status: DISCONTINUED | OUTPATIENT
Start: 2025-07-07 | End: 2025-07-08 | Stop reason: HOSPADM

## 2025-07-07 RX ORDER — METOPROLOL SUCCINATE 25 MG/1
12.5 TABLET, EXTENDED RELEASE ORAL DAILY
Status: DISCONTINUED | OUTPATIENT
Start: 2025-07-07 | End: 2025-07-07

## 2025-07-07 RX ORDER — SPIRONOLACTONE 25 MG/1
25 TABLET ORAL DAILY
Status: DISCONTINUED | OUTPATIENT
Start: 2025-07-07 | End: 2025-07-08

## 2025-07-07 RX ORDER — MAGNESIUM HYDROXIDE/ALUMINUM HYDROXICE/SIMETHICONE 120; 1200; 1200 MG/30ML; MG/30ML; MG/30ML
30 SUSPENSION ORAL EVERY 6 HOURS PRN
Status: DISCONTINUED | OUTPATIENT
Start: 2025-07-07 | End: 2025-07-08 | Stop reason: HOSPADM

## 2025-07-07 RX ORDER — ZINC OXIDE 13 %
1 CREAM (GRAM) TOPICAL DAILY
Status: DISCONTINUED | OUTPATIENT
Start: 2025-07-07 | End: 2025-07-07

## 2025-07-07 RX ORDER — SODIUM CHLORIDE 9 MG/ML
INJECTION, SOLUTION INTRAVENOUS PRN
Status: DISCONTINUED | OUTPATIENT
Start: 2025-07-07 | End: 2025-07-08 | Stop reason: HOSPADM

## 2025-07-07 RX ORDER — BISACODYL 10 MG
10 SUPPOSITORY, RECTAL RECTAL DAILY PRN
Status: DISCONTINUED | OUTPATIENT
Start: 2025-07-07 | End: 2025-07-08 | Stop reason: HOSPADM

## 2025-07-07 RX ORDER — POLYETHYLENE GLYCOL 3350 17 G/17G
17 POWDER, FOR SOLUTION ORAL DAILY PRN
Status: DISCONTINUED | OUTPATIENT
Start: 2025-07-07 | End: 2025-07-08 | Stop reason: HOSPADM

## 2025-07-07 RX ORDER — METHOCARBAMOL 750 MG/1
750 TABLET, FILM COATED ORAL 3 TIMES DAILY
Status: DISCONTINUED | OUTPATIENT
Start: 2025-07-08 | End: 2025-07-08 | Stop reason: HOSPADM

## 2025-07-07 RX ORDER — FLUTICASONE PROPIONATE 50 MCG
1 SPRAY, SUSPENSION (ML) NASAL 2 TIMES DAILY PRN
Status: DISCONTINUED | OUTPATIENT
Start: 2025-07-07 | End: 2025-07-08 | Stop reason: HOSPADM

## 2025-07-07 RX ORDER — ASCORBIC ACID 500 MG
500 TABLET ORAL DAILY
Status: DISCONTINUED | OUTPATIENT
Start: 2025-07-08 | End: 2025-07-08 | Stop reason: HOSPADM

## 2025-07-07 RX ORDER — POTASSIUM CHLORIDE 7.45 MG/ML
10 INJECTION INTRAVENOUS PRN
Status: DISCONTINUED | OUTPATIENT
Start: 2025-07-07 | End: 2025-07-08 | Stop reason: HOSPADM

## 2025-07-07 RX ORDER — VITAMIN B COMPLEX
2000 TABLET ORAL DAILY
Status: DISCONTINUED | OUTPATIENT
Start: 2025-07-08 | End: 2025-07-08 | Stop reason: HOSPADM

## 2025-07-07 RX ORDER — METOPROLOL SUCCINATE 25 MG/1
12.5 TABLET, EXTENDED RELEASE ORAL DAILY
Status: DISCONTINUED | OUTPATIENT
Start: 2025-07-07 | End: 2025-07-08

## 2025-07-07 RX ORDER — ENOXAPARIN SODIUM 100 MG/ML
40 INJECTION SUBCUTANEOUS DAILY
Status: DISCONTINUED | OUTPATIENT
Start: 2025-07-08 | End: 2025-07-08 | Stop reason: HOSPADM

## 2025-07-07 RX ORDER — FAMOTIDINE 20 MG/1
10 TABLET, FILM COATED ORAL DAILY PRN
Status: DISCONTINUED | OUTPATIENT
Start: 2025-07-07 | End: 2025-07-08 | Stop reason: HOSPADM

## 2025-07-07 RX ORDER — SODIUM CHLORIDE 0.9 % (FLUSH) 0.9 %
5-40 SYRINGE (ML) INJECTION EVERY 12 HOURS SCHEDULED
Status: DISCONTINUED | OUTPATIENT
Start: 2025-07-07 | End: 2025-07-08 | Stop reason: HOSPADM

## 2025-07-07 RX ORDER — POTASSIUM CHLORIDE 1500 MG/1
40 TABLET, EXTENDED RELEASE ORAL PRN
Status: DISCONTINUED | OUTPATIENT
Start: 2025-07-07 | End: 2025-07-08 | Stop reason: HOSPADM

## 2025-07-07 RX ORDER — SIMETHICONE 125 MG
125 CAPSULE ORAL 4 TIMES DAILY PRN
Status: DISCONTINUED | OUTPATIENT
Start: 2025-07-07 | End: 2025-07-07

## 2025-07-07 RX ORDER — DULOXETIN HYDROCHLORIDE 60 MG/1
60 CAPSULE, DELAYED RELEASE ORAL DAILY
Status: DISCONTINUED | OUTPATIENT
Start: 2025-07-08 | End: 2025-07-08 | Stop reason: HOSPADM

## 2025-07-07 RX ORDER — CETIRIZINE HYDROCHLORIDE 10 MG/1
10 TABLET ORAL PRN
Status: DISCONTINUED | OUTPATIENT
Start: 2025-07-07 | End: 2025-07-08 | Stop reason: HOSPADM

## 2025-07-07 RX ORDER — METOPROLOL SUCCINATE 25 MG/1
25 TABLET, EXTENDED RELEASE ORAL DAILY
Status: DISCONTINUED | OUTPATIENT
Start: 2025-07-07 | End: 2025-07-07

## 2025-07-07 RX ORDER — VITAMIN B COMPLEX
1 CAPSULE ORAL DAILY
Status: DISCONTINUED | OUTPATIENT
Start: 2025-07-07 | End: 2025-07-07

## 2025-07-07 RX ORDER — LANOLIN ALCOHOL/MO/W.PET/CERES
400 CREAM (GRAM) TOPICAL NIGHTLY
Status: DISCONTINUED | OUTPATIENT
Start: 2025-07-08 | End: 2025-07-08 | Stop reason: HOSPADM

## 2025-07-07 RX ORDER — ROSUVASTATIN CALCIUM 20 MG/1
10 TABLET, COATED ORAL NIGHTLY
Status: DISCONTINUED | OUTPATIENT
Start: 2025-07-07 | End: 2025-07-08 | Stop reason: HOSPADM

## 2025-07-07 RX ORDER — SODIUM CHLORIDE 0.9 % (FLUSH) 0.9 %
5-40 SYRINGE (ML) INJECTION PRN
Status: DISCONTINUED | OUTPATIENT
Start: 2025-07-07 | End: 2025-07-08 | Stop reason: HOSPADM

## 2025-07-07 RX ADMIN — MAGNESIUM SULFATE HEPTAHYDRATE 2000 MG: 40 INJECTION, SOLUTION INTRAVENOUS at 23:37

## 2025-07-07 RX ADMIN — SACUBITRIL AND VALSARTAN 1 TABLET: 24; 26 TABLET, FILM COATED ORAL at 21:29

## 2025-07-07 RX ADMIN — METOPROLOL SUCCINATE 12.5 MG: 25 TABLET, EXTENDED RELEASE ORAL at 21:47

## 2025-07-07 RX ADMIN — SPIRONOLACTONE 25 MG: 25 TABLET ORAL at 21:47

## 2025-07-07 RX ADMIN — ROSUVASTATIN CALCIUM 10 MG: 20 TABLET, FILM COATED ORAL at 21:31

## 2025-07-07 RX ADMIN — SODIUM CHLORIDE, PRESERVATIVE FREE 10 ML: 5 INJECTION INTRAVENOUS at 21:33

## 2025-07-07 RX ADMIN — Medication 9 MG: at 21:28

## 2025-07-07 RX ADMIN — TRAZODONE HYDROCHLORIDE 50 MG: 50 TABLET ORAL at 21:30

## 2025-07-08 VITALS
RESPIRATION RATE: 18 BRPM | TEMPERATURE: 99.1 F | HEART RATE: 75 BPM | DIASTOLIC BLOOD PRESSURE: 76 MMHG | SYSTOLIC BLOOD PRESSURE: 111 MMHG | WEIGHT: 147.49 LBS | BODY MASS INDEX: 23.7 KG/M2 | OXYGEN SATURATION: 98 % | HEIGHT: 66 IN

## 2025-07-08 PROBLEM — I47.20 VT (VENTRICULAR TACHYCARDIA) (HCC): Status: ACTIVE | Noted: 2025-07-08

## 2025-07-08 PROBLEM — I47.10 SVT (SUPRAVENTRICULAR TACHYCARDIA): Status: ACTIVE | Noted: 2025-07-07

## 2025-07-08 LAB
ANION GAP SERPL CALC-SCNC: 12 MMOL/L (ref 7–16)
BASOPHILS # BLD: 0.03 K/UL (ref 0–0.2)
BASOPHILS NFR BLD: 0.5 % (ref 0–2)
BUN SERPL-MCNC: 10 MG/DL (ref 6–23)
CALCIUM SERPL-MCNC: 9.6 MG/DL (ref 8.8–10.2)
CHLORIDE SERPL-SCNC: 104 MMOL/L (ref 98–107)
CO2 SERPL-SCNC: 22 MMOL/L (ref 20–29)
CREAT SERPL-MCNC: 0.8 MG/DL (ref 0.6–1.1)
DIFFERENTIAL METHOD BLD: NORMAL
EKG ATRIAL RATE: 81 BPM
EKG DIAGNOSIS: NORMAL
EKG P AXIS: 44 DEGREES
EKG P-R INTERVAL: 154 MS
EKG Q-T INTERVAL: 396 MS
EKG QRS DURATION: 82 MS
EKG QTC CALCULATION (BAZETT): 460 MS
EKG R AXIS: -10 DEGREES
EKG T AXIS: 45 DEGREES
EKG VENTRICULAR RATE: 81 BPM
EOSINOPHIL # BLD: 0.13 K/UL (ref 0–0.8)
EOSINOPHIL NFR BLD: 2.3 % (ref 0.5–7.8)
ERYTHROCYTE [DISTWIDTH] IN BLOOD BY AUTOMATED COUNT: 13.2 % (ref 11.9–14.6)
GLUCOSE SERPL-MCNC: 103 MG/DL (ref 70–99)
HCT VFR BLD AUTO: 37.9 % (ref 35.8–46.3)
HGB BLD-MCNC: 12 G/DL (ref 11.7–15.4)
IMM GRANULOCYTES # BLD AUTO: 0.01 K/UL (ref 0–0.5)
IMM GRANULOCYTES NFR BLD AUTO: 0.2 % (ref 0–5)
LYMPHOCYTES # BLD: 1.94 K/UL (ref 0.5–4.6)
LYMPHOCYTES NFR BLD: 34.2 % (ref 13–44)
MAGNESIUM SERPL-MCNC: 2.3 MG/DL (ref 1.8–2.4)
MCH RBC QN AUTO: 27.7 PG (ref 26.1–32.9)
MCHC RBC AUTO-ENTMCNC: 31.7 G/DL (ref 31.4–35)
MCV RBC AUTO: 87.5 FL (ref 82–102)
MONOCYTES # BLD: 0.42 K/UL (ref 0.1–1.3)
MONOCYTES NFR BLD: 7.4 % (ref 4–12)
NEUTS SEG # BLD: 3.15 K/UL (ref 1.7–8.2)
NEUTS SEG NFR BLD: 55.4 % (ref 43–78)
NRBC # BLD: 0 K/UL (ref 0–0.2)
PLATELET # BLD AUTO: 293 K/UL (ref 150–450)
PMV BLD AUTO: 9.9 FL (ref 9.4–12.3)
POTASSIUM SERPL-SCNC: 3.7 MMOL/L (ref 3.5–5.1)
RBC # BLD AUTO: 4.33 M/UL (ref 4.05–5.2)
SODIUM SERPL-SCNC: 138 MMOL/L (ref 136–145)
WBC # BLD AUTO: 5.7 K/UL (ref 4.3–11.1)

## 2025-07-08 PROCEDURE — 83735 ASSAY OF MAGNESIUM: CPT

## 2025-07-08 PROCEDURE — 85025 COMPLETE CBC W/AUTO DIFF WBC: CPT

## 2025-07-08 PROCEDURE — 99232 SBSQ HOSP IP/OBS MODERATE 35: CPT | Performed by: INTERNAL MEDICINE

## 2025-07-08 PROCEDURE — 6370000000 HC RX 637 (ALT 250 FOR IP): Performed by: INTERNAL MEDICINE

## 2025-07-08 PROCEDURE — 93010 ELECTROCARDIOGRAM REPORT: CPT | Performed by: INTERNAL MEDICINE

## 2025-07-08 PROCEDURE — 80048 BASIC METABOLIC PNL TOTAL CA: CPT

## 2025-07-08 PROCEDURE — 36415 COLL VENOUS BLD VENIPUNCTURE: CPT

## 2025-07-08 PROCEDURE — 99254 IP/OBS CNSLTJ NEW/EST MOD 60: CPT | Performed by: INTERNAL MEDICINE

## 2025-07-08 PROCEDURE — 2500000003 HC RX 250 WO HCPCS: Performed by: INTERNAL MEDICINE

## 2025-07-08 RX ORDER — SPIRONOLACTONE 25 MG/1
12.5 TABLET ORAL DAILY
Qty: 30 TABLET | Refills: 0 | Status: SHIPPED | OUTPATIENT
Start: 2025-07-09

## 2025-07-08 RX ORDER — ASPIRIN 81 MG/1
81 TABLET, CHEWABLE ORAL ONCE
Status: COMPLETED | OUTPATIENT
Start: 2025-07-08 | End: 2025-07-08

## 2025-07-08 RX ORDER — SPIRONOLACTONE 25 MG/1
12.5 TABLET ORAL DAILY
Status: DISCONTINUED | OUTPATIENT
Start: 2025-07-09 | End: 2025-07-08 | Stop reason: HOSPADM

## 2025-07-08 RX ORDER — POTASSIUM CHLORIDE 1500 MG/1
20 TABLET, EXTENDED RELEASE ORAL ONCE
Status: COMPLETED | OUTPATIENT
Start: 2025-07-08 | End: 2025-07-08

## 2025-07-08 RX ORDER — LANOLIN ALCOHOL/MO/W.PET/CERES
400 CREAM (GRAM) TOPICAL NIGHTLY
Qty: 30 TABLET | Refills: 0 | Status: SHIPPED | OUTPATIENT
Start: 2025-07-08 | End: 2025-07-08

## 2025-07-08 RX ORDER — LANOLIN ALCOHOL/MO/W.PET/CERES
400 CREAM (GRAM) TOPICAL NIGHTLY
Qty: 30 TABLET | Refills: 0 | Status: SHIPPED | OUTPATIENT
Start: 2025-07-08

## 2025-07-08 RX ORDER — METOPROLOL SUCCINATE 25 MG/1
25 TABLET, EXTENDED RELEASE ORAL DAILY
Qty: 30 TABLET | Refills: 0 | Status: SHIPPED | OUTPATIENT
Start: 2025-07-09 | End: 2025-07-08

## 2025-07-08 RX ORDER — SPIRONOLACTONE 25 MG/1
12.5 TABLET ORAL DAILY
Qty: 30 TABLET | Refills: 0 | Status: SHIPPED | OUTPATIENT
Start: 2025-07-09 | End: 2025-07-08

## 2025-07-08 RX ORDER — METOPROLOL SUCCINATE 25 MG/1
25 TABLET, EXTENDED RELEASE ORAL DAILY
Status: DISCONTINUED | OUTPATIENT
Start: 2025-07-09 | End: 2025-07-08 | Stop reason: HOSPADM

## 2025-07-08 RX ORDER — METOPROLOL SUCCINATE 25 MG/1
25 TABLET, EXTENDED RELEASE ORAL DAILY
Qty: 30 TABLET | Refills: 0 | Status: SHIPPED | OUTPATIENT
Start: 2025-07-09

## 2025-07-08 RX ADMIN — METOPROLOL SUCCINATE 12.5 MG: 25 TABLET, EXTENDED RELEASE ORAL at 07:51

## 2025-07-08 RX ADMIN — POTASSIUM CHLORIDE 20 MEQ: 1500 TABLET, EXTENDED RELEASE ORAL at 10:52

## 2025-07-08 RX ADMIN — METHOCARBAMOL TABLETS 750 MG: 750 TABLET, COATED ORAL at 14:38

## 2025-07-08 RX ADMIN — PANTOPRAZOLE SODIUM 40 MG: 40 TABLET, DELAYED RELEASE ORAL at 06:23

## 2025-07-08 RX ADMIN — ASPIRIN 81 MG: 81 TABLET, CHEWABLE ORAL at 07:51

## 2025-07-08 RX ADMIN — SODIUM CHLORIDE, PRESERVATIVE FREE 10 ML: 5 INJECTION INTRAVENOUS at 07:52

## 2025-07-08 RX ADMIN — SACUBITRIL AND VALSARTAN 1 TABLET: 24; 26 TABLET, FILM COATED ORAL at 07:51

## 2025-07-08 NOTE — CONSULTS
[facility] CARDIOLOGY PROGRESS NOTE           7/7/2025 9:26 PM    Admit Date: 7/7/2025      Subjective:   Patient was seen and examined in her room.  Consult transcribed by Ms. Coulter.  I agree with her assessment and plan  CC:ICD shock  ROS:  GEN:  No fever or chills  Cardiovascular:  As noted above: Chronic atypical chest pain.  Chronic dyspnea on exertion.  Chronic fatigue  Pulmonary:  As noted above: Chronic dyspnea  Neuro:  No new focal motor or sensory loss    Objective:      Vitals:    07/07/25 1802 07/07/25 1913 07/07/25 2100   BP: 132/89 (!) 126/90    Pulse: 60 77    Resp: 20 16    Temp: 98.2 °F (36.8 °C) 98.3 °F (36.8 °C)    TempSrc: Oral Temporal    SpO2: 100% 99%    Weight:   68.9 kg (151 lb 14.4 oz)       Physical Exam:  General-NAD  Neck- supple, no JVD  CV- regular rate and rhythm no MRG  Lung- clear bilaterally  Abd- soft, nontender, nondistended  Ext- no edema bilaterally.  Skin- warm and dry  Psychiatric:  Normal mood and affect.  Neurologic:  Alert and oriented X 3      Data Review:   Recent Labs     07/07/25 2027      K 4.3   MG 1.7*   BUN 9   WBC 5.7   HGB 12.8   HCT 41.3          TELEMETRY:  NSR    Assessment/Plan:     Principal Problem:    VT (ventricular tachycardia) (HCC):Admit to inpatient telemetry.Continue Toprol.Repeat limited echo to assess LV EF.Consider repeat cath to exclude development of CAD since cath in 2015.Correct low Mg level.  Active Problems:    NICM (nonischemic cardiomyopathy) (HCC):Stable.Continue GDMT with Toprol,Entresto,and Aldactone.Repeat limited echo to re assess LV EF.Consider repeat cardiac cath .    Defibrillator discharge for VT:Continue Toprol.Repeat limited echo to re assess LV EF.Consider repeat cardiac cath to exclude ischemic etiology .Consider future Cordarone use.EP consult.              CANDICE SHOEMAKER MD  7/7/2025 9:26 PM   
      [facility] [place] Cardiology Initial Cardiac Evaluation      Date of  Admission: 7/7/2025  5:57 PM     Primary Care Physician: Nery Thorpe, APRN - NP  Primary Cardiologist: Dr. Morris  Referring Physician: Dr. Trotter  Supervising Physician: Dr. Jay    CC/Reason for evaluation: VT    HPI:  Asia Rushing is a 54 y.o. female with prior medical history of NICM/HFrEF (TTE 35%, mod global hypokinesis, mild MR) s/p BSC DC ICD 10/2021, HLD. She presented to ED at [facility] after being shocked by her ICD and device interrogation showed several bouts of VF/VT.  She states she was standing in her kitchen mopping the floor when she felt weak all over her body, mildly lightheaded and SOB. She then stopped mopping and fell to the floor and called her  for help. Compliant with medications, no illicit substance use.     Workup in the ED revealed: Cr 1.02, NT Pro-BNP , Mag 1.8, K 4.1, CXR WNL. She was transferred to [facility] for further evaluation, admitted and Cardiology saw in consultation. Given her VF/VT EP was consulted for further evaluation/recommendations.       Recent Cardiac Synopsis:  06/05/25    ECHO (TTE) COMPLETE (PRN CONTRAST/BUBBLE/STRAIN/3D) 06/06/2025  7:40 PM (Final)    Interpretation Summary    Left Ventricle: Moderately reduced left ventricular systolic function with a visually estimated EF of 35 - 40%. Left ventricle size is normal. Normal wall thickness. Moderate global hypokinesis present. Grade I diastolic dysfunction with normal LAP.    Mitral Valve: Mild regurgitation.    Signed by: Zachary Hanna MD on 6/6/2025  7:40 PM    6/8/2015 [facility]:  CONCLUSIONS  1. Low normal left ventricular systolic function with normal diastolic  filling pressures.  2. Normal-appearing coronary arteries.    Past Medical History:   Diagnosis Date    Anemia     hx of iron infusions    Arthritis     knees    Chronic HFrEF (heart failure with reduced ejection fraction) (HCC)     Chronic pain     knees    GERD 
Never    Smokeless tobacco: Never   Substance Use Topics    Alcohol use: Yes     Comment: occasional        Current Facility-Administered Medications   Medication Dose Route Frequency    [START ON 7/8/2025] ascorbic acid (VITAMIN C) tablet 500 mg  500 mg Oral Daily    cetirizine (ZYRTEC) tablet 10 mg  10 mg Oral PRN    [START ON 7/8/2025] Vitamin D (CHOLECALCIFEROL) tablet 2,000 Units  2,000 Units Oral Daily    [START ON 7/8/2025] DULoxetine (CYMBALTA) extended release capsule 60 mg  60 mg Oral Daily    fluticasone (FLONASE) 50 MCG/ACT nasal spray 1 spray  1 spray Nasal BID PRN    linaclotide (LINZESS) capsule 290 mcg (Patient Supplied)  290 mcg Oral Daily PRN    [START ON 7/8/2025] magnesium oxide (MAG-OX) tablet 400 mg  400 mg Oral Nightly    melatonin tablet 9 mg  9 mg Oral Nightly    [START ON 7/8/2025] methocarbamol (ROBAXIN) tablet 750 mg  750 mg Oral TID    [START ON 7/8/2025] pantoprazole (PROTONIX) tablet 40 mg  40 mg Oral QAM AC    rosuvastatin (CRESTOR) tablet 10 mg  10 mg Oral Nightly    sacubitril-valsartan (ENTRESTO) 24-26 MG per tablet 1 tablet  1 tablet Oral BID    spironolactone (ALDACTONE) tablet 25 mg  25 mg Oral Daily    traZODone (DESYREL) tablet 50 mg  50 mg Oral Nightly PRN    metoprolol succinate (TOPROL XL) extended release tablet 12.5 mg  12.5 mg Oral Daily       Review of Symptoms:  General: no weight change,  + weakness, fever or chills  Skin: no rashes, lumps, or other skin changes  HEENT: no headache, dizziness, lightheadedness, vision changes, hearing changes, tinnitus, vertigo, sinus pressure/pain, bleeding gums, sore throat, or hoarseness  Neck: no swollen glands, goiter, pain or stiffness  Respiratory: no cough, sputum, hemoptysis, no dyspnea, wheezing  Cardiovascular: + as per HPI  Gastrointestinal: + GERD  Urinary: no frequency, urgency , hematuria, burning/pain with urination, recent flank pain, polyuria, nocturia, or difficulty urinating  Peripheral Vascular: no claudication,

## 2025-07-08 NOTE — PROGRESS NOTES
[facility] CARDIOLOGY PROGRESS NOTE           7/8/2025 8:09 AM    Admit Date: 7/7/2025      Subjective:   Patient admitted last night with ventricular tachycardia and ICD discharges.  No current chest pain or dyspnea.  Plan for left heart cath later today    ROS:  Cardiovascular:  As noted above    Objective:      Vitals:    07/08/25 0502 07/08/25 0529 07/08/25 0625 07/08/25 0702   BP:    108/74   Pulse: 68 78  62   Resp:    17   Temp:    97.5 °F (36.4 °C)   TempSrc:    Temporal   SpO2:    96%   Weight:   66.9 kg (147 lb 7.8 oz)    Height:           Physical Exam:  General-No Acute Distress  Neck- supple, no JVD  CV- regular rate and rhythm no MRG  Lung- clear bilaterally  Abd- soft, nontender, nondistended  Ext- no edema bilaterally.  Skin- warm and dry      Data Review:   Recent Labs     07/08/25  0329 07/07/25 2027   WBC 5.7 5.7   HGB 12.0 12.8   HCT 37.9 41.3   MCV 87.5 91.0    310       Recent Labs     07/08/25  0329      K 3.7      CO2 22   BUN 10   CREATININE 0.80   GLUCOSE 103*   CALCIUM 9.6   LABGLOM 87       No results for input(s): \"CKTOTAL\", \"CKMB\", \"CKMBINDEX\", \"DDIMER\", \"TROPONINI\" in the last 720 hours.           Assessment/Plan:     Active Hospital Problems    *VT (ventricular tachycardia) ([facility])  Left heart cath and EP consultation.  Keep potassium greater than 4 magnesium greater than 2.      Defibrillator discharge  Noted.  See above      NICM (nonischemic cardiomyopathy) ([facility])  Moderate LV dysfunction on last echo.  Continue guideline directed medical therapy with Toprol, Aldactone and Entresto.      Addendum: Reviewed ICD findings.  Appears likely SVT and not ventricular tachycardia.  No plans for cardiac catheterization at this time.  EP consultation pending.          Prashanth Aldrich MD    
4 Eyes Skin Assessment     NAME:  Asia Rushing  YOB: 1970  MEDICAL RECORD NUMBER:  [id number]    The patient is being assessed for  Post-Op Surgical    I agree that at least one RN has performed a thorough Head to Toe Skin Assessment on the patient. ALL assessment sites listed below have been assessed.      Areas assessed by both nurses:    Sacrum. Buttock, Coccyx, Ischium and Legs. Feet and Heels    Patient has boggy heels. Heels are elevated.        Does the Patient have a Wound? No noted wound(s)       Isaias Prevention initiated by RN: Yes  Wound Care Orders initiated by RN: No    Pressure Injury (Stage 1,2,3,4, Unstageable, DTI, NWPT, and Complex wounds) if present, place Wound referral order by RN under : No    New Ostomies, if present place, Ostomy referral order under : No     Nurse 1 eSignature: Electronically signed by Mark Jones RN on 7/7/25 at 6:11 PM EDT    **SHARE this note so that the co-signing nurse can place an eSignature**    Nurse 2 eSignature: Electronically signed by Estelle Ruiz RN on 7/7/25 at 8:04 PM EDT   
Discharge instructions, follow up appointments and prescriptions reviewed with patient and family. Both verbalize understanding. All personal belongings taken with patient. Family member will drive patient home. Patient escorted to discharge area via wheelchair. Patient is stable at discharge.       
TRANSFER - IN REPORT:    Verbal report received from Ethel GAMING on Asia Rushing  being received from another facility for routine progression of patient care      Report consisted of patient's Situation, Background, Assessment and   Recommendations(SBAR).     Information from the following report(s) ED Encounter Summary, ED SBAR, Adult Overview, Intake/Output, MAR, and Recent Results was reviewed with the receiving nurse.    Opportunity for questions and clarification was provided.          
complaints for me besides constipation. Will continue to follow.      Current Nutrition Therapies:  ADULT DIET; Regular; Low Fat/Low Chol/High Fiber/2 gm Na    Current Intake:   Average Meal Intake: NPO Average Supplements Intake: NPO      Anthropometric Measures:  Height: 167.6 cm (5' 5.98\")  Current Body Wt: 66.9 kg (147 lb 7.8 oz) (7/8), Weight source: Standing scale  BMI: 23.8, Normal Weight (BMI 18.5-24.9)  Admission Body Weight: 68.5 kg (151 lb) (7/7 not specified)  Ideal Body Weight (Kg) (Calculated): 59 kg (130 lbs), 113.5 %  BMI Category Normal Weight (BMI 18.5-24.9)    Comparative Standards:  Energy (kcal/day): 6189-1585 (25-28 kcals/kg) (Kcal/kg Weight used: 66.9 kg Current (7/8 standing scale)  Protein (g/day): 64-67 (0.8-1 g/kg) Weight Used: (Current (7/8 standing scale)) 66.9 kg  Fluid (ml/day):   (1 ml/kcal)    Nutrition Diagnosis:   Inadequate oral intake related to decreased appetite as evidenced by poor intake prior to admission    Nutrition Goal(s):      Active Goal: Meet at least 75% of estimated needs, by next RD assessment       Discharge Planning:    Continue current diet    Becca Holcomb RD

## 2025-07-08 NOTE — H&P
Hospitalist History and Physical   Admit Date:  2025  5:57 PM   Name:  Asia Rushing   Age:  54 y.o.  Sex:  female  :  1970   MRN:  [id number]   Room:      Presenting/Chief Complaint: fatigue and defibrillator firing.     Reason(s) for Admission: Chest pain [R07.9]     History of Present Illness:   Asia Rushing is a 54 y.o. female with medical history of dilated cardiomyopathy s/p aicd, htn, chf r ef, htn, hld,allergic rhinitis,slow transit constipation has been feeling weak.  She had a defibrillator firing one time and she was taken to [facility] ([facility]).  Apparently pt was in VT from analysis of rhythm.  She did defibrillator fire 3 more times while in ED.  Pt transferred to [facility] as her cardiologist is  Dr. Tina Ledesma of [facility] cardiology. Pt has no complaints except fatigue     Assessment & Plan:     Active Problems:  AICD (automatic cardioverter/defibrillator) present  Fired off yesterday  VT was the underlying rhythm that resulted in shock  Will check bmp and mag.  Keep mag>2, potassium >4  On Toprol xl 25 mg po qd  Consult cardiology    NICM (nonischemic cardiomyopathy) (HCC)/chfrEF  Continue entresto  and spironolactone.  Last echo from :  EF 35-40%, normal size, impaired relaxation, mildly reduced RV function, mild MR, TR, RVSP 27     Dyslipidemia  On rosuvastatin 10mg po qhs    Depression  On cymbalta    STC (slow transit constipation)  On linzess    GERD  On protonix    PT/OT evals ordered?  Defer to primary team  Diet: low sodium diet fluid restricion 1500ml  VTE prophylaxis: Lovenox  Code status: full code  Code status discussed: Yes  Blood consent obtained: N/A      Non-peripheral Lines and Tubes (if present):             Hospital Problems:  Active Problems:    AICD (automatic cardioverter/defibrillator) present    NICM (nonischemic cardiomyopathy) (HCC)  Resolved Problems:    * No resolved hospital problems. *        Objective:   Patient

## 2025-07-08 NOTE — CARE COORDINATION
If patient is discharged prior to next notation, then this note serves as note for discharge by case management.      SW CM completed chart review and met with pt at bedside for initial CM assessment. Pt admitted with chest pain and defibrillator discharge. Pt alert and oriented x 4. Demographics, insurance, and PCP discussed and verified. PCP NICO Yang, last visit 6/18/25. Pt lives with spouse in one level home with level entrance. Pt independent with ADLs, full-time employee, and an active  in the community PTA. No DME utilized at baseline. No history of HH/STR. Pt with local supportive family. Positive for fall PTA. Spouse plans to provide transportation at discharge.     No PT/OT needs anticipated at this time.    DC/POC to return home with family when medically stable.    CM team to continue to follow for any needs that may arise.     LUPE Funez, [facility]   Care Manager  [facility]       07/08/25 5873   Service Assessment   Patient Orientation Alert and Oriented;Person;Place;Situation;Self   Cognition Alert   History Provided By Patient;Medical Record   Primary Caregiver Self   Accompanied By/Relationship none   Support Systems Spouse/Significant Other;Family Members;Children   Patient's Healthcare Decision Maker is: Legal Next of Kin  (spouse Madhav)   PCP Verified by CM Yes  (Nery Thorpe, NICO)   Last Visit to PCP Within last 3 months  (6/18//25)   Prior Functional Level Independent in ADLs/IADLs   Current Functional Level Independent in ADLs/IADLs   Can patient return to prior living arrangement Yes   Ability to make needs known: Good   Family able to assist with home care needs: Yes   Would you like for me to discuss the discharge plan with any other family members/significant others, and if so, who? Yes  (spouse Madhav)   Financial Resources Other (Comment)  ([place] [employer] (Primary); [employer] (Secondary))   Community Resources None   CM/SW Referral Other (see

## 2025-07-08 NOTE — DISCHARGE SUMMARY
Hospitalist Discharge Summary   Admit Date:  2025  5:57 PM   DC Note date: 2025  Name:  Asia Rushing   Age:  54 y.o.  Sex:  female  :  1970   MRN:  [id number]   Room:  [address] [place] [address]  PCP:  Nery Thorpe APRN - NP    Presenting Complaint: No chief complaint on file.     Initial Admission Diagnosis: Chest pain [R07.9]  Defibrillator discharge [Z45.02]     Problem List for this Hospitalization (present on admission):    Principal Problem:    SVT (supraventricular tachycardia)  Active Problems:    NICM (nonischemic cardiomyopathy) ([facility])    Defibrillator discharge  Resolved Problems:    * No resolved hospital problems. *      Hospital Course:    Ms. Rushing is a 53 yo female with PMH:    -dilated cardiomyopathy  -ICD  -HTN  -HFrEF      Admitted with ICD firing   Per notes, she was in VTACH and transferred to this facility for her cardiologist evaluation   Goal is to keep potassium > 4 and magnesium > 2  She was continued on increased Toprol dose   ECHO was  \"      Left Ventricle: Moderately reduced left ventricular systolic function with a visually estimated EF of 35 - 40%. Left ventricle size is normal. Normal wall thickness. Moderate global hypokinesis present. Grade I diastolic dysfunction with normal LAP.    Mitral Valve: Mild regurgitation.\"      Per cardiology she has been seen by EP who feels she had SVT and not VTACH  No need for left heart cath     Discharge is to home     Disposition: Home  Diet: ADULT DIET; Regular; Low Fat/Low Chol/High Fiber/2 gm Na  Code Status: Full Code    Follow Ups:  Follow-up Information       Follow up With Specialties Details Why Contact Info    Nery Thorpe APRN - NP Nurse Practitioner Follow up in 1 week(s)  [address] [facility] [address] [phone]      [facility] CARDIOLOGY Cardiology Follow up in 2 week(s)  [address] [place] [id number] [phone]

## 2025-07-08 NOTE — PLAN OF CARE
Problem: Discharge Planning  Goal: Discharge to home or other facility with appropriate resources  7/8/2025 0950 by Mark Jones RN  Outcome: Progressing  Flowsheets (Taken 7/8/2025 0950)  Discharge to home or other facility with appropriate resources:   Identify barriers to discharge with patient and caregiver   Arrange for needed discharge resources and transportation as appropriate   Identify discharge learning needs (meds, wound care, etc)   Refer to discharge planning if patient needs post-hospital services based on physician order or complex needs related to functional status, cognitive ability or social support system  7/7/2025 2251 by Matilda Davey RN  Outcome: Progressing  7/7/2025 2250 by Matilda Davey RN  Outcome: Progressing  Flowsheets (Taken 7/7/2025 2250)  Discharge to home or other facility with appropriate resources:   Identify barriers to discharge with patient and caregiver   Arrange for needed discharge resources and transportation as appropriate   Identify discharge learning needs (meds, wound care, etc)   Arrange for interpreters to assist at discharge as needed     Problem: Safety - Adult  Goal: Free from fall injury  7/8/2025 0950 by Mark Jones RN  Outcome: Progressing  Flowsheets (Taken 7/8/2025 0950)  Free From Fall Injury: Instruct family/caregiver on patient safety  7/7/2025 2251 by Matilda Davey RN  Outcome: Progressing  7/7/2025 2250 by Matilda Davey RN  Outcome: Progressing  Flowsheets (Taken 7/7/2025 2250)  Free From Fall Injury: Instruct family/caregiver on patient safety     Problem: Pain  Goal: Verbalizes/displays adequate comfort level or baseline comfort level  7/8/2025 0950 by Mark Jones RN  Outcome: Progressing  Flowsheets (Taken 7/8/2025 0950)  Verbalizes/displays adequate comfort level or baseline comfort level:   Encourage patient to monitor pain and request assistance   Assess pain using appropriate pain scale

## 2025-07-18 NOTE — PROGRESS NOTES
respiratory distress.      Breath sounds: No wheezing or rales.   Musculoskeletal:         General: No swelling.      Cervical back: Neck supple.   Skin:     General: Skin is warm and dry.   Neurological:      General: No focal deficit present.      Mental Status: She is alert.   Psychiatric:         Mood and Affect: Mood normal.         Behavior: Behavior normal.         Medical problems and test results were reviewed with the patient today.     DATA REVIEW        BMP  Lab Results   Component Value Date/Time     07/08/2025 03:29 AM    K 3.7 07/08/2025 03:29 AM     07/08/2025 03:29 AM    CO2 22 07/08/2025 03:29 AM    BUN 10 07/08/2025 03:29 AM    CREATININE 0.80 07/08/2025 03:29 AM    GLUCOSE 103 07/08/2025 03:29 AM    CALCIUM 9.6 07/08/2025 03:29 AM        Lab Results   Component Value Date/Time    MG 2.3 07/08/2025 03:29 AM       TSH 2.8      EKG        CXR/IMAGING        DEVICE INTERROGATION        OUTSIDE RECORDS REVIEW    Records from outside providers have been reviewed and summarized as noted in the HPI, past history and data review sections of this note, and reviewed with patient. .       ASSESSMENT and PLAN    Asia was seen today for follow-up.    Diagnoses and all orders for this visit:    SVT (supraventricular tachycardia)    Iatrogenic hypotension    NICM (nonischemic cardiomyopathy) ([facility])    AICD (automatic cardioverter/defibrillator) present    Other orders  -     magnesium oxide (MAG-OX) 400 (240 Mg) MG tablet; Take 1 tablet by mouth at bedtime  -     metoprolol succinate (TOPROL XL) 25 MG extended release tablet; Take 1 tablet by mouth daily          IMPRESSION:    She's of course had some anxiety/almost like a PTSD after device discharge.  Discussed it today.  Gradually return to activities and if confidence fails to come let me or PCP know so we can help work on anxiety.     NYHA II, continue meds, EF stable.          Return in about 6 months (around 1/21/2026).          Thank you

## 2025-07-21 ENCOUNTER — OFFICE VISIT (OUTPATIENT)
Age: 55
End: 2025-07-21
Payer: COMMERCIAL

## 2025-07-21 VITALS
DIASTOLIC BLOOD PRESSURE: 78 MMHG | HEIGHT: 70 IN | HEART RATE: 73 BPM | WEIGHT: 150 LBS | SYSTOLIC BLOOD PRESSURE: 118 MMHG | BODY MASS INDEX: 21.47 KG/M2

## 2025-07-21 DIAGNOSIS — I42.8 NICM (NONISCHEMIC CARDIOMYOPATHY) (HCC): ICD-10-CM

## 2025-07-21 DIAGNOSIS — I47.10 SVT (SUPRAVENTRICULAR TACHYCARDIA): Primary | ICD-10-CM

## 2025-07-21 DIAGNOSIS — Z95.810 AICD (AUTOMATIC CARDIOVERTER/DEFIBRILLATOR) PRESENT: ICD-10-CM

## 2025-07-21 DIAGNOSIS — I95.89 IATROGENIC HYPOTENSION: ICD-10-CM

## 2025-07-21 PROCEDURE — 99214 OFFICE O/P EST MOD 30 MIN: CPT | Performed by: INTERNAL MEDICINE

## 2025-07-21 RX ORDER — KETOCONAZOLE 20 MG/ML
SHAMPOO, SUSPENSION TOPICAL DAILY PRN
COMMUNITY

## 2025-07-21 RX ORDER — METOPROLOL SUCCINATE 25 MG/1
25 TABLET, EXTENDED RELEASE ORAL DAILY
Qty: 90 TABLET | Refills: 3 | Status: SHIPPED | OUTPATIENT
Start: 2025-07-21

## 2025-07-21 RX ORDER — METOCLOPRAMIDE 10 MG/1
TABLET ORAL
COMMUNITY

## 2025-07-21 RX ORDER — OLOPATADINE HYDROCHLORIDE 2 MG/ML
SOLUTION OPHTHALMIC
COMMUNITY

## 2025-07-21 RX ORDER — MELOXICAM 15 MG/1
15 TABLET ORAL DAILY
COMMUNITY
Start: 2024-07-30

## 2025-07-21 RX ORDER — LOTEPREDNOL ETABONATE 5 MG/ML
SUSPENSION/ DROPS OPHTHALMIC
COMMUNITY

## 2025-07-21 RX ORDER — MECLIZINE HYDROCHLORIDE 25 MG/1
TABLET ORAL
COMMUNITY
Start: 2025-05-19

## 2025-07-21 RX ORDER — LANOLIN ALCOHOL/MO/W.PET/CERES
400 CREAM (GRAM) TOPICAL NIGHTLY
Qty: 90 TABLET | Refills: 3 | Status: SHIPPED | OUTPATIENT
Start: 2025-07-21

## 2025-07-21 ASSESSMENT — ENCOUNTER SYMPTOMS: SHORTNESS OF BREATH: 1

## 2025-08-08 PROCEDURE — 93295 DEV INTERROG REMOTE 1/2/MLT: CPT | Performed by: INTERNAL MEDICINE

## 2025-08-08 PROCEDURE — 93296 REM INTERROG EVL PM/IDS: CPT | Performed by: INTERNAL MEDICINE

## 2025-08-12 ENCOUNTER — TELEPHONE (OUTPATIENT)
Age: 55
End: 2025-08-12

## 2025-08-14 ENCOUNTER — OFFICE VISIT (OUTPATIENT)
Age: 55
End: 2025-08-14
Payer: COMMERCIAL

## 2025-08-14 VITALS
HEART RATE: 60 BPM | DIASTOLIC BLOOD PRESSURE: 72 MMHG | HEIGHT: 70 IN | SYSTOLIC BLOOD PRESSURE: 130 MMHG | BODY MASS INDEX: 20.62 KG/M2 | WEIGHT: 144 LBS

## 2025-08-14 DIAGNOSIS — I50.22 CHRONIC HFREF (HEART FAILURE WITH REDUCED EJECTION FRACTION) (HCC): Primary | ICD-10-CM

## 2025-08-14 DIAGNOSIS — I47.10 SVT (SUPRAVENTRICULAR TACHYCARDIA): ICD-10-CM

## 2025-08-14 DIAGNOSIS — I95.89 IATROGENIC HYPOTENSION: ICD-10-CM

## 2025-08-14 DIAGNOSIS — Z95.810 AICD (AUTOMATIC CARDIOVERTER/DEFIBRILLATOR) PRESENT: ICD-10-CM

## 2025-08-14 DIAGNOSIS — I42.8 NICM (NONISCHEMIC CARDIOMYOPATHY) (HCC): ICD-10-CM

## 2025-08-14 PROCEDURE — 99214 OFFICE O/P EST MOD 30 MIN: CPT | Performed by: INTERNAL MEDICINE

## 2025-08-14 RX ORDER — SACUBITRIL AND VALSARTAN 24; 26 MG/1; MG/1
1 TABLET, FILM COATED ORAL 2 TIMES DAILY
Qty: 180 TABLET | Refills: 3 | Status: SHIPPED | OUTPATIENT
Start: 2025-08-14

## 2025-08-14 ASSESSMENT — ENCOUNTER SYMPTOMS: SHORTNESS OF BREATH: 1

## (undated) DEVICE — UNIVERSAL DRAPES: Brand: MEDLINE INDUSTRIES, INC.

## (undated) DEVICE — DRESSING,GAUZE,XEROFORM,CURAD,5"X9",ST: Brand: CURAD

## (undated) DEVICE — PUMP PAIN MGMT 400ML 4ML/HR 2 W/ SIL SOAK CATH FOR ABD

## (undated) DEVICE — SUTURE STRATAFIX SPRL SZ 3-0 L9IN ABSRB VLT FS L26MM 3/8 SXPD2B419

## (undated) DEVICE — DERMABOND SKIN ADH 0.7ML -- DERMABOND ADVANCED 12/BX

## (undated) DEVICE — NEEDLE HYPO 21GA L1.5IN INTRAMUSCULAR S STL LATCH BVL UP

## (undated) DEVICE — SUTURE ABSRB X-1 REV CUT 1/2 CIR 22MM UD BRAID 27IN SZ 3-0 J458H

## (undated) DEVICE — 3M™ MEDIPORE™ H SOFT CLOTH SURGICAL TAPE 2864, 4 INCH X 10 YARD (10CM X 9,14M), 12 ROLLS/CASE: Brand: 3M™ MEDIPORE™

## (undated) DEVICE — INTENDED FOR TISSUE SEPARATION, AND OTHER PROCEDURES THAT REQUIRE A SHARP SURGICAL BLADE TO PUNCTURE OR CUT.: Brand: BARD-PARKER ® STAINLESS STEEL BLADES

## (undated) DEVICE — 18G NG KIT WITH 96IN PROBE COVER (10 PK): Brand: SITE-RITE

## (undated) DEVICE — INTRODUCER SHEATH SAFESHEATH 8FR 13CM SPLITTABLE DILATOR

## (undated) DEVICE — SOLUTION IRRIG 1000ML 0.9% SOD CHL USP POUR PLAS BTL

## (undated) DEVICE — INTRO PEELWY HEMVLV 6F 13CM -- SHRT PRELUDE SNAP

## (undated) DEVICE — SURGICAL BRA: Brand: DEROYAL

## (undated) DEVICE — SUTURE NONABSORBABLE MONOFILAMENT 4-0 PS-2 18 IN BLK ETHILON 1667G

## (undated) DEVICE — MARKER SURG SKIN UTIL BLK REG TIP NONSMEARING W/ 6IN RUL

## (undated) DEVICE — SUTURE ABSORBABLE MONOFILAMENT 4-0 CV15 6 IN PUR V-LOC 90 VLOCM1203

## (undated) DEVICE — PAD,ABDOMINAL,5"X9",ST,LF,25/BX: Brand: MEDLINE INDUSTRIES, INC.

## (undated) DEVICE — SPONGE GZ W4XL4IN COT 12 PLY TYP VII WVN C FLD DSGN STERILE

## (undated) DEVICE — PREMIUM WET SKIN PREP TRAY: Brand: MEDLINE INDUSTRIES, INC.

## (undated) DEVICE — PLASMABLADE X PS210-030S-LIGHT 3.0SL: Brand: PLASMABLADE™ X

## (undated) DEVICE — SUT ETHBND 0 18IN MO6 MP GRN --

## (undated) DEVICE — MICROPUNCTURE INTRODUCER SET SILHOUETTE TRANSITIONLESS WITH NITINOL WIRE GUIDE: Brand: MICROPUNCTURE

## (undated) DEVICE — Z DUPLICATE USE 2275497 DRSG POSTOP PRMSL AG 3.5X6IN

## (undated) DEVICE — GLOVE SURG SZ 65 THK91MIL LTX FREE SYN POLYISOPRENE

## (undated) DEVICE — GLOVE SURG SZ 65 CRM LTX FREE POLYISOPRENE POLYMER BEAD ANTI

## (undated) DEVICE — SUTURE VCRL SZ 4-0 L18IN ABSRB UD L19MM PS-2 3/8 CIR PRIM J496H

## (undated) DEVICE — Device

## (undated) DEVICE — SPONGE LAPAROTOMY W18XL18IN WHITE STRUNG RADIOPAQUE STERILE

## (undated) DEVICE — GLOVE SURG SZ 65 L12IN FNGR THK79MIL GRN LTX FREE

## (undated) DEVICE — BANDAGE,GAUZE,BULKEE II,4.5"X4.1YD,STRL: Brand: MEDLINE

## (undated) DEVICE — SUTURE MCRYL SZ 5-0 L18IN ABSRB UD L19MM PS-2 3/8 CIR PRIM Y495G

## (undated) DEVICE — GOWN,SIRUS,NONRNF,SETINSLV,XL,20/CS: Brand: MEDLINE

## (undated) DEVICE — SUTURE MCRYL SZ 4-0 L27IN ABSRB UD L19MM PS-2 1/2 CIR PRIM Y426H

## (undated) DEVICE — MINOR SPLIT GENERAL: Brand: MEDLINE INDUSTRIES, INC.